# Patient Record
Sex: FEMALE | Race: WHITE | Employment: UNEMPLOYED | URBAN - METROPOLITAN AREA
[De-identification: names, ages, dates, MRNs, and addresses within clinical notes are randomized per-mention and may not be internally consistent; named-entity substitution may affect disease eponyms.]

---

## 2017-02-15 PROBLEM — F31.9 BIPOLAR DISORDER WITH DEPRESSION (HCC): Status: ACTIVE | Noted: 2017-02-15

## 2017-02-15 PROBLEM — I10 ESSENTIAL HYPERTENSION: Status: ACTIVE | Noted: 2017-02-15

## 2017-02-15 PROBLEM — E78.2 MIXED HYPERLIPIDEMIA: Status: ACTIVE | Noted: 2017-02-15

## 2017-05-16 PROBLEM — F41.9 ANXIETY: Status: ACTIVE | Noted: 2017-05-16

## 2017-05-16 PROBLEM — K21.9 GASTROESOPHAGEAL REFLUX DISEASE WITHOUT ESOPHAGITIS: Status: ACTIVE | Noted: 2017-05-16

## 2017-06-13 PROCEDURE — 75810000289 HC I&D ABSCESS SIMP/COMP/MULT: Performed by: EMERGENCY MEDICINE

## 2017-06-13 PROCEDURE — 99283 EMERGENCY DEPT VISIT LOW MDM: CPT | Performed by: EMERGENCY MEDICINE

## 2017-06-14 ENCOUNTER — HOSPITAL ENCOUNTER (EMERGENCY)
Age: 42
Discharge: HOME | End: 2017-06-14
Attending: EMERGENCY MEDICINE
Payer: COMMERCIAL

## 2017-06-14 VITALS
TEMPERATURE: 98.4 F | BODY MASS INDEX: 33.59 KG/M2 | WEIGHT: 214 LBS | DIASTOLIC BLOOD PRESSURE: 55 MMHG | OXYGEN SATURATION: 98 % | SYSTOLIC BLOOD PRESSURE: 110 MMHG | HEART RATE: 102 BPM | HEIGHT: 67 IN | RESPIRATION RATE: 16 BRPM

## 2017-06-14 DIAGNOSIS — L02.419 THIGH ABSCESS: Primary | ICD-10-CM

## 2017-06-14 PROCEDURE — 74011250637 HC RX REV CODE- 250/637: Performed by: EMERGENCY MEDICINE

## 2017-06-14 PROCEDURE — 75810000289 HC I&D ABSCESS SIMP/COMP/MULT: Performed by: EMERGENCY MEDICINE

## 2017-06-14 RX ORDER — HYDROCODONE BITARTRATE AND ACETAMINOPHEN 5; 325 MG/1; MG/1
1 TABLET ORAL ONCE
Status: COMPLETED | OUTPATIENT
Start: 2017-06-14 | End: 2017-06-14

## 2017-06-14 RX ORDER — CLINDAMYCIN HYDROCHLORIDE 150 MG/1
300 CAPSULE ORAL 3 TIMES DAILY
Qty: 42 CAP | Refills: 0 | Status: SHIPPED | OUTPATIENT
Start: 2017-06-14 | End: 2017-06-21

## 2017-06-14 RX ADMIN — HYDROCODONE BITARTRATE AND ACETAMINOPHEN 1 TABLET: 5; 325 TABLET ORAL at 03:05

## 2017-06-14 NOTE — DISCHARGE INSTRUCTIONS
Return with any fevers, spreading redness, increasing pain, worsening symptoms, or additional concerns. Follow-up with your primary care doctor in 2 or 3 days for reevaluation.

## 2017-06-14 NOTE — ED TRIAGE NOTES
Pt states she has an abscess type knot on her upper inner thigh that has a mole attached to it.  Pt c/o pain that is severe

## 2017-06-14 NOTE — ED PROVIDER NOTES
HPI Comments: 59-year-old lady with a history of an abscess in her inner, upper, left thigh that started over the last 24 hours. Patient says it is extremely tender to the touch. Patient says she has never had this kind of problem before. She denies any fevers or chills. Elements of this note were created using speech recognition software. As such, there may be errors of speech recognition present. Patient is a 39 y.o. female presenting with abscess. The history is provided by the patient. Abscess           Past Medical History:   Diagnosis Date    Bipolar disorder ([address]     Borderline personality disorder     Calculus of kidney     Chronic kidney disease     glomerular nephritis    GERD (gastroesophageal reflux disease)     Headache     Hypertension     Morbid obesity ([address]     OCD (obsessive compulsive disorder)     Panic attacks     Psychiatric disorder     bipolar, ptsd, anxiety, ocd,mmd       Past Surgical History:   Procedure Laterality Date    HX APPENDECTOMY      HX GYN          HX GYN      tubal ligation         Family History:   Problem Relation Age of Onset    Breast Cancer Mother 39    Cancer Mother      cervical     Breast Cancer Sister 44    Heart Disease Maternal Grandmother 24     aneurysm     Cancer Father      kidney     Heart Disease Father     Kidney Disease Father     No Known Problems Maternal Grandfather     Breast Cancer Paternal Grandmother 43    Heart Failure Paternal Grandfather     Other Sister      brain tumor        Social History     Social History    Marital status:      Spouse name: N/A    Number of children: N/A    Years of education: N/A     Occupational History    Not on file.      Social History Main Topics    Smoking status: Current Every Day Smoker     Packs/day: 1.00     Years: 20.00    Smokeless tobacco: Never Used    Alcohol use No      Comment: quit 4 years ago    Drug use: No    Sexual activity: Yes     Other Topics Concern    Not on file     Social History Narrative         ALLERGIES: Erythromycin; Novocain [procaine]; Pcn [penicillins]; Phenobarbital; Sulfa (sulfonamide antibiotics); Sulfur; and Tetracycline    Review of Systems   Constitutional: Negative for chills and fever. Gastrointestinal: Negative for nausea and vomiting. Skin: Positive for color change. Negative for wound. Vitals:    06/13/17 2257   BP: 119/65   Pulse: (!) 102   Resp: 16   Temp: 98.4 °F (36.9 °C)   SpO2: 95%   Weight: 97.1 kg (214 lb)   Height: 5' 7\" (1.702 m)            Physical Exam   Constitutional: She is oriented to person, place, and time. Neurological: She is alert and oriented to person, place, and time. Skin:   Quarter-sized abscess in her left upper inner thigh   Nursing note and vitals reviewed. [facility]  ED Course       I&D Abcess Simple  Date/Time: 6/14/2017 2:53 AM  Performed by: Blayne Hahn  Authorized by: Blayne Hahn     Consent:     Consent obtained:  Verbal    Consent given by:  Patient    Risks discussed:  Incomplete drainage    Alternatives discussed:  No treatment  Location:     Type:  Abscess    Location:  Lower extremity    Lower extremity location:  Leg    Leg location:  L upper leg  Pre-procedure details:     Skin preparation:  Chloraprep  Anesthesia (see MAR for exact dosages): Anesthesia method:  Local infiltration    Local anesthetic:  Lidocaine 1% w/o epi  Procedure type:     Complexity:  Simple  Procedure details:     Needle aspiration: no      Incision types:  Single straight    Incision depth:  Subcutaneous    Scalpel blade:  11    Wound management:  Probed and deloculated    Drainage:  Purulent    Drainage amount: Moderate    Wound treatment:  Wound left open    Packing materials:  None  Post-procedure details:     Patient tolerance of procedure:   Tolerated well, no immediate complications

## 2017-09-28 PROBLEM — N05.9 GLOMERULONEPHRITIS: Status: ACTIVE | Noted: 2017-09-28

## 2017-10-19 ENCOUNTER — HOSPITAL ENCOUNTER (OUTPATIENT)
Dept: ULTRASOUND IMAGING | Age: 42
Discharge: HOME | End: 2017-10-19
Attending: INTERNAL MEDICINE
Payer: COMMERCIAL

## 2017-10-19 DIAGNOSIS — N18.9 CHRONIC KIDNEY DISEASE (CKD): ICD-10-CM

## 2017-10-19 DIAGNOSIS — I10 HYPERTENSION: ICD-10-CM

## 2017-10-19 PROCEDURE — 76770 US EXAM ABDO BACK WALL COMP: CPT

## 2017-10-19 PROCEDURE — 93975 VASCULAR STUDY: CPT

## 2018-02-13 ENCOUNTER — APPOINTMENT (RX ONLY)
Dept: URBAN - METROPOLITAN AREA CLINIC 349 | Facility: CLINIC | Age: 43
Setting detail: DERMATOLOGY
End: 2018-02-13

## 2018-02-13 DIAGNOSIS — D18.0 HEMANGIOMA: ICD-10-CM

## 2018-02-13 DIAGNOSIS — L85.8 OTHER SPECIFIED EPIDERMAL THICKENING: ICD-10-CM

## 2018-02-13 DIAGNOSIS — Z80.8 FAMILY HISTORY OF MALIGNANT NEOPLASM OF OTHER ORGANS OR SYSTEMS: ICD-10-CM

## 2018-02-13 DIAGNOSIS — B07.8 OTHER VIRAL WARTS: ICD-10-CM

## 2018-02-13 DIAGNOSIS — L21.8 OTHER SEBORRHEIC DERMATITIS: ICD-10-CM

## 2018-02-13 DIAGNOSIS — Z71.89 OTHER SPECIFIED COUNSELING: ICD-10-CM

## 2018-02-13 PROBLEM — L29.8 OTHER PRURITUS: Status: ACTIVE | Noted: 2018-02-13

## 2018-02-13 PROBLEM — C44.519 BASAL CELL CARCINOMA OF SKIN OF OTHER PART OF TRUNK: Status: ACTIVE | Noted: 2018-02-13

## 2018-02-13 PROBLEM — L85.3 XEROSIS CUTIS: Status: ACTIVE | Noted: 2018-02-13

## 2018-02-13 PROBLEM — C44.719 BASAL CELL CARCINOMA OF SKIN OF LEFT LOWER LIMB, INCLUDING HIP: Status: ACTIVE | Noted: 2018-02-13

## 2018-02-13 PROBLEM — C44.319 BASAL CELL CARCINOMA OF SKIN OF OTHER PARTS OF FACE: Status: ACTIVE | Noted: 2018-02-13

## 2018-02-13 PROBLEM — D18.01 HEMANGIOMA OF SKIN AND SUBCUTANEOUS TISSUE: Status: ACTIVE | Noted: 2018-02-13

## 2018-02-13 PROBLEM — I10 ESSENTIAL (PRIMARY) HYPERTENSION: Status: ACTIVE | Noted: 2018-02-13

## 2018-02-13 PROCEDURE — 11306 SHAVE SKIN LESION 0.6-1.0 CM: CPT | Mod: 76

## 2018-02-13 PROCEDURE — ? RECOMMENDATIONS

## 2018-02-13 PROCEDURE — 11306 SHAVE SKIN LESION 0.6-1.0 CM: CPT

## 2018-02-13 PROCEDURE — ? SHAVE REMOVAL

## 2018-02-13 PROCEDURE — 99242 OFF/OP CONSLTJ NEW/EST SF 20: CPT | Mod: 25

## 2018-02-13 PROCEDURE — 88305 TISSUE EXAM BY PATHOLOGIST: CPT

## 2018-02-13 PROCEDURE — ? COUNSELING

## 2018-02-13 PROCEDURE — ? TREATMENT REGIMEN

## 2018-02-13 PROCEDURE — ? DEFER

## 2018-02-13 PROCEDURE — A4550 SURGICAL TRAYS: HCPCS

## 2018-02-13 PROCEDURE — ? PATHOLOGY BILLING

## 2018-02-13 ASSESSMENT — LOCATION SIMPLE DESCRIPTION DERM
LOCATION SIMPLE: LEFT UPPER BACK
LOCATION SIMPLE: RIGHT HEEL
LOCATION SIMPLE: CHEST
LOCATION SIMPLE: LEFT THUMB
LOCATION SIMPLE: SCALP
LOCATION SIMPLE: LEFT HEEL
LOCATION SIMPLE: RIGHT FOOT
LOCATION SIMPLE: ABDOMEN

## 2018-02-13 ASSESSMENT — LOCATION ZONE DERM
LOCATION ZONE: FINGER
LOCATION ZONE: FEET
LOCATION ZONE: TRUNK
LOCATION ZONE: SCALP

## 2018-02-13 ASSESSMENT — LOCATION DETAILED DESCRIPTION DERM
LOCATION DETAILED: LEFT DISTAL RADIAL THUMB
LOCATION DETAILED: RIGHT HEEL
LOCATION DETAILED: RIGHT CENTRAL PARIETAL SCALP
LOCATION DETAILED: LEFT HEEL
LOCATION DETAILED: LEFT SUPERIOR UPPER BACK
LOCATION DETAILED: LEFT SUPERIOR PARIETAL SCALP
LOCATION DETAILED: RIGHT LATERAL ACHILLES SKIN
LOCATION DETAILED: MIDDLE STERNUM
LOCATION DETAILED: LEFT CENTRAL PARIETAL SCALP
LOCATION DETAILED: SUBXIPHOID

## 2018-02-13 NOTE — HPI: SKIN LESIONS
How Severe Is Your Skin Lesion?: mild
Have Your Skin Lesions Been Treated?: not been treated
Is This A New Presentation, Or A Follow-Up?: Skin Lesions
Which Family Member (Optional)?: Father sister

## 2018-02-13 NOTE — PROCEDURE: RECOMMENDATIONS
Detail Level: Zone
Recommendation Preamble: Treatment recommendations
Recommendations (Free Text): Patient advised otc Head and shoulders with zinc wash affected area daily

## 2018-02-13 NOTE — PROCEDURE: SHAVE REMOVAL
Medical Necessity Information: It is in your best interest to select a reason for this procedure from the list below. All of these items fulfill various CMS LCD requirements except the new and changing color options.
Wound Care: Vaseline
Size Of Lesion In Cm (Required): 0.6
Medical Necessity Clause: This procedure was medically necessary because the lesion that was treated was: changing
Notification Instructions: Patient will be notified of biopsy results. However, patient instructed to call the office if not contacted within 2 weeks.
Bill For Surgical Tray: yes
Anesthesia Volume In Cc: 0.5
Accession #: Dr Marx read
Biopsy Method: Dermablade
Consent was obtained from the patient. The risks and benefits to therapy were discussed in detail. Specifically, the risks of infection, scarring, bleeding, prolonged wound healing, incomplete removal, allergy to anesthesia, nerve injury and recurrence were addressed. Prior to the procedure, the treatment site was clearly identified and confirmed by the patient. All components of Universal Protocol/PAUSE Rule completed.
Accession #: Dr Marx read
X Size Of Lesion In Cm (Optional): 0.7
Anesthesia Type: 2% lidocaine with epinephrine
Bill 08370 For Specimen Handling/Conveyance To Laboratory?: no
Hemostasis: Electrocautery
Detail Level: Detailed
Billing Type: Third-Party Bill
Post-Care Instructions: I reviewed with the patient in detail post-care instructions. Patient is to keep the biopsy site dry overnight, and then apply vaseline twice daily until healed. Patient may apply hydrogen peroxide soaks to remove any crusting. After the procedure, the patient was observed for 5-10 minutes and was oriented to person, place and time and demied feeling dizzy, queasy, and stated that they did not feel that they were going to faint.
Size Of Margin In Cm (Margins Are Not Added To Billing Dimensions): -
X Size Of Lesion In Cm (Optional): 0

## 2018-02-13 NOTE — HPI: WARTS (VERRUCA)
Is This A New Presentation, Or A Follow-Up?: Warts
How Severe Are Your Warts?: mild
Additional History: patient saw podiatrist for treatment

## 2018-04-17 ENCOUNTER — HOSPITAL ENCOUNTER (OUTPATIENT)
Dept: CT IMAGING | Age: 43
Discharge: HOME | End: 2018-04-17
Attending: NURSE PRACTITIONER
Payer: COMMERCIAL

## 2018-04-17 VITALS — BODY MASS INDEX: 29.82 KG/M2 | HEIGHT: 67 IN | WEIGHT: 190 LBS

## 2018-04-17 DIAGNOSIS — R31.0 GROSS HEMATURIA: ICD-10-CM

## 2018-04-17 DIAGNOSIS — R63.4 WEIGHT LOSS: ICD-10-CM

## 2018-04-17 DIAGNOSIS — R53.83 FATIGUE, UNSPECIFIED TYPE: ICD-10-CM

## 2018-04-17 LAB — CREAT BLD-MCNC: 1.5 MG/DL (ref 0.8–1.5)

## 2018-04-17 PROCEDURE — 74011000258 HC RX REV CODE- 258

## 2018-04-17 PROCEDURE — 82565 ASSAY OF CREATININE: CPT

## 2018-04-17 PROCEDURE — 74011250636 HC RX REV CODE- 250/636

## 2018-04-17 PROCEDURE — 74011636320 HC RX REV CODE- 636/320

## 2018-04-17 PROCEDURE — 74178 CT ABD&PLV WO CNTR FLWD CNTR: CPT

## 2018-04-17 RX ORDER — SODIUM CHLORIDE 0.9 % (FLUSH) 0.9 %
10 SYRINGE (ML) INJECTION
Status: COMPLETED | OUTPATIENT
Start: 2018-04-17 | End: 2018-04-17

## 2018-04-17 RX ORDER — SODIUM CHLORIDE 9 MG/ML
3 INJECTION, SOLUTION INTRAVENOUS ONCE
Status: COMPLETED | OUTPATIENT
Start: 2018-04-17 | End: 2018-04-17

## 2018-04-17 RX ORDER — SODIUM CHLORIDE 9 MG/ML
1 INJECTION, SOLUTION INTRAVENOUS AS NEEDED
Status: ACTIVE | OUTPATIENT
Start: 2018-04-17 | End: 2018-04-18

## 2018-04-17 RX ADMIN — SODIUM CHLORIDE 100 ML: 900 INJECTION, SOLUTION INTRAVENOUS at 12:33

## 2018-04-17 RX ADMIN — SODIUM CHLORIDE 3 ML/KG/HR: 900 INJECTION, SOLUTION INTRAVENOUS at 09:00

## 2018-04-17 RX ADMIN — Medication 10 ML: at 12:33

## 2018-04-17 RX ADMIN — IOPAMIDOL 100 ML: 755 INJECTION, SOLUTION INTRAVENOUS at 12:33

## 2018-04-17 NOTE — PROGRESS NOTES
No clear indication for hematuria and other symptoms. Referral placed to Urology on 4/5/18, please make sure to follow up with them.

## 2018-04-17 NOTE — PROGRESS NOTES
Patient tolerated IV fluids well. Ate lunch, appetite good. Ambulated several times to bathroom to void. Instructed to force oral fluids today and notify physician with any problems. Patient verbalizes understanding. PIV removed intact, site clear. Patient discharged ambulatory in stable condition.

## 2018-04-20 ENCOUNTER — APPOINTMENT (RX ONLY)
Dept: URBAN - METROPOLITAN AREA CLINIC 349 | Facility: CLINIC | Age: 43
Setting detail: DERMATOLOGY
End: 2018-04-20

## 2018-04-20 DIAGNOSIS — B07.8 OTHER VIRAL WARTS: ICD-10-CM

## 2018-04-20 DIAGNOSIS — L57.0 ACTINIC KERATOSIS: ICD-10-CM

## 2018-04-20 PROCEDURE — ? LIQUID NITROGEN

## 2018-04-20 PROCEDURE — ? COUNSELING

## 2018-04-20 PROCEDURE — 17110 DESTRUCTION B9 LES UP TO 14: CPT

## 2018-04-20 PROCEDURE — 17000 DESTRUCT PREMALG LESION: CPT | Mod: 59

## 2018-04-20 PROCEDURE — ? BENIGN DESTRUCTION

## 2018-04-20 PROCEDURE — 17003 DESTRUCT PREMALG LES 2-14: CPT

## 2018-04-20 ASSESSMENT — PAIN INTENSITY VAS: HOW INTENSE IS YOUR PAIN 0 BEING NO PAIN, 10 BEING THE MOST SEVERE PAIN POSSIBLE?: 1/10 PAIN

## 2018-04-20 ASSESSMENT — LOCATION SIMPLE DESCRIPTION DERM
LOCATION SIMPLE: LEFT THIGH
LOCATION SIMPLE: RIGHT GREAT TOE
LOCATION SIMPLE: RIGHT FOOT
LOCATION SIMPLE: ABDOMEN
LOCATION SIMPLE: ABDOMEN
LOCATION SIMPLE: LEFT THUMB
LOCATION SIMPLE: LEFT THIGH
LOCATION SIMPLE: LEFT THUMB

## 2018-04-20 ASSESSMENT — LOCATION DETAILED DESCRIPTION DERM
LOCATION DETAILED: LEFT DISTAL RADIAL THUMB
LOCATION DETAILED: LEFT RIB CAGE
LOCATION DETAILED: LEFT RIB CAGE
LOCATION DETAILED: RIGHT DORSAL GREAT TOE
LOCATION DETAILED: LEFT ANTERIOR PROXIMAL THIGH
LOCATION DETAILED: LEFT DISTAL RADIAL THUMB
LOCATION DETAILED: RIGHT LATERAL DORSAL FOOT
LOCATION DETAILED: LEFT ANTERIOR PROXIMAL THIGH

## 2018-04-20 ASSESSMENT — LOCATION ZONE DERM
LOCATION ZONE: TRUNK
LOCATION ZONE: FEET
LOCATION ZONE: LEG
LOCATION ZONE: LEG
LOCATION ZONE: TOE
LOCATION ZONE: FINGER
LOCATION ZONE: TRUNK
LOCATION ZONE: FINGER

## 2018-04-20 NOTE — PROCEDURE: BENIGN DESTRUCTION
Post-Care Instructions: I reviewed with the patient in detail post-care instructions. Patient is to wear sunprotection, and avoid picking at any of the treated lesions. Pt may apply Vaseline to crusted or scabbing areas.
Medical Necessity Clause: This procedure was medically necessary because the lesions that were treated were:
Add 52 Modifier (Optional): no
Medical Necessity Information: It is in your best interest to select a reason for this procedure from the list below. All of these items fulfill various CMS LCD requirements except the new and changing color options.
Include Z78.9 (Other Specified Conditions Influencing Health Status) As An Associated Diagnosis?: Yes
Consent: The patient's consent was obtained including but not limited to risks of crusting, scabbing, blistering, scarring, darker or lighter pigmentary change, recurrence, incomplete removal and infection.
Treatment Number (Will Not Render If 0): 0
Detail Level: Zone

## 2018-04-20 NOTE — PROCEDURE: LIQUID NITROGEN
Post-Care Instructions: I reviewed with the patient in detail post-care instructions. Patient is to wear sunprotection, and avoid picking at any of the treated lesions. Pt may apply Vaseline to crusted or scabbing areas.
Consent: The patient's consent was obtained including but not limited to risks of crusting, scabbing, blistering, scarring, darker or lighter pigmentary change, recurrence, incomplete removal and infection.
Detail Level: Zone
Duration Of Freeze Thaw-Cycle (Seconds): 3
Number Of Freeze-Thaw Cycles: 2 freeze-thaw cycles
Render Post-Care Instructions In Note?: no

## 2018-05-17 ENCOUNTER — HOSPITAL ENCOUNTER (EMERGENCY)
Age: 43
Discharge: HOME | End: 2018-05-17
Payer: COMMERCIAL

## 2018-05-17 ENCOUNTER — APPOINTMENT (OUTPATIENT)
Dept: CT IMAGING | Age: 43
End: 2018-05-17
Payer: COMMERCIAL

## 2018-05-17 VITALS
SYSTOLIC BLOOD PRESSURE: 121 MMHG | BODY MASS INDEX: 26.98 KG/M2 | HEART RATE: 96 BPM | HEIGHT: 68 IN | DIASTOLIC BLOOD PRESSURE: 76 MMHG | OXYGEN SATURATION: 99 % | WEIGHT: 178 LBS | TEMPERATURE: 98.5 F | RESPIRATION RATE: 20 BRPM

## 2018-05-17 DIAGNOSIS — L03.811 CELLULITIS OF HEAD EXCEPT FACE: Primary | ICD-10-CM

## 2018-05-17 LAB
ANION GAP SERPL CALC-SCNC: 6 MMOL/L (ref 7–16)
BASOPHILS # BLD: 0 K/UL (ref 0–0.2)
BASOPHILS NFR BLD: 0 % (ref 0–2)
BUN SERPL-MCNC: 18 MG/DL (ref 6–23)
CALCIUM SERPL-MCNC: 8.9 MG/DL (ref 8.3–10.4)
CHLORIDE SERPL-SCNC: 103 MMOL/L (ref 98–107)
CO2 SERPL-SCNC: 31 MMOL/L (ref 21–32)
CREAT SERPL-MCNC: 1.58 MG/DL (ref 0.6–1)
DIFFERENTIAL METHOD BLD: ABNORMAL
EOSINOPHIL # BLD: 0.1 K/UL (ref 0–0.8)
EOSINOPHIL NFR BLD: 1 % (ref 0.5–7.8)
ERYTHROCYTE [DISTWIDTH] IN BLOOD BY AUTOMATED COUNT: 13.9 % (ref 11.9–14.6)
GLUCOSE SERPL-MCNC: 87 MG/DL (ref 65–100)
HCT VFR BLD AUTO: 39 % (ref 35.8–46.3)
HGB BLD-MCNC: 13.1 G/DL (ref 11.7–15.4)
IMM GRANULOCYTES # BLD: 0 K/UL (ref 0–0.5)
IMM GRANULOCYTES NFR BLD AUTO: 0 % (ref 0–5)
LYMPHOCYTES # BLD: 2.2 K/UL (ref 0.5–4.6)
LYMPHOCYTES NFR BLD: 23 % (ref 13–44)
MCH RBC QN AUTO: 30 PG (ref 26.1–32.9)
MCHC RBC AUTO-ENTMCNC: 33.6 G/DL (ref 31.4–35)
MCV RBC AUTO: 89.2 FL (ref 79.6–97.8)
MONOCYTES # BLD: 0.7 K/UL (ref 0.1–1.3)
MONOCYTES NFR BLD: 7 % (ref 4–12)
NEUTS SEG # BLD: 6.6 K/UL (ref 1.7–8.2)
NEUTS SEG NFR BLD: 69 % (ref 43–78)
PLATELET # BLD AUTO: 228 K/UL (ref 150–450)
PMV BLD AUTO: 10 FL (ref 10.8–14.1)
POTASSIUM SERPL-SCNC: 3.4 MMOL/L (ref 3.5–5.1)
RBC # BLD AUTO: 4.37 M/UL (ref 4.05–5.25)
SODIUM SERPL-SCNC: 140 MMOL/L (ref 136–145)
WBC # BLD AUTO: 9.7 K/UL (ref 4.3–11.1)

## 2018-05-17 PROCEDURE — 74011000258 HC RX REV CODE- 258

## 2018-05-17 PROCEDURE — 74011250636 HC RX REV CODE- 250/636

## 2018-05-17 PROCEDURE — 74011636320 HC RX REV CODE- 636/320

## 2018-05-17 PROCEDURE — 96372 THER/PROPH/DIAG INJ SC/IM: CPT

## 2018-05-17 PROCEDURE — 96374 THER/PROPH/DIAG INJ IV PUSH: CPT

## 2018-05-17 PROCEDURE — 85025 COMPLETE CBC W/AUTO DIFF WBC: CPT

## 2018-05-17 PROCEDURE — 99283 EMERGENCY DEPT VISIT LOW MDM: CPT

## 2018-05-17 PROCEDURE — 70491 CT SOFT TISSUE NECK W/DYE: CPT

## 2018-05-17 PROCEDURE — 80048 BASIC METABOLIC PNL TOTAL CA: CPT

## 2018-05-17 PROCEDURE — 96361 HYDRATE IV INFUSION ADD-ON: CPT

## 2018-05-17 RX ORDER — SODIUM CHLORIDE 9 MG/ML
1000 INJECTION, SOLUTION INTRAVENOUS ONCE
Status: COMPLETED | OUTPATIENT
Start: 2018-05-17 | End: 2018-05-17

## 2018-05-17 RX ORDER — NAPROXEN 500 MG/1
500 TABLET ORAL 2 TIMES DAILY WITH MEALS
Qty: 20 TAB | Refills: 0 | Status: SHIPPED | OUTPATIENT
Start: 2018-05-17 | End: 2018-05-18

## 2018-05-17 RX ORDER — NALBUPHINE HYDROCHLORIDE 10 MG/ML
10 INJECTION, SOLUTION INTRAMUSCULAR; INTRAVENOUS; SUBCUTANEOUS
Status: COMPLETED | OUTPATIENT
Start: 2018-05-17 | End: 2018-05-17

## 2018-05-17 RX ORDER — SODIUM CHLORIDE 0.9 % (FLUSH) 0.9 %
10 SYRINGE (ML) INJECTION
Status: COMPLETED | OUTPATIENT
Start: 2018-05-17 | End: 2018-05-17

## 2018-05-17 RX ORDER — CLINDAMYCIN HYDROCHLORIDE 300 MG/1
300 CAPSULE ORAL 4 TIMES DAILY
Qty: 28 CAP | Refills: 0 | Status: SHIPPED | OUTPATIENT
Start: 2018-05-17 | End: 2018-05-17

## 2018-05-17 RX ORDER — CLINDAMYCIN HYDROCHLORIDE 300 MG/1
300 CAPSULE ORAL 4 TIMES DAILY
Qty: 28 CAP | Refills: 0 | Status: SHIPPED | OUTPATIENT
Start: 2018-05-17 | End: 2018-05-24

## 2018-05-17 RX ORDER — KETOROLAC TROMETHAMINE 30 MG/ML
60 INJECTION, SOLUTION INTRAMUSCULAR; INTRAVENOUS
Status: COMPLETED | OUTPATIENT
Start: 2018-05-17 | End: 2018-05-17

## 2018-05-17 RX ORDER — NAPROXEN 500 MG/1
500 TABLET ORAL 2 TIMES DAILY WITH MEALS
Qty: 20 TAB | Refills: 0 | Status: SHIPPED | OUTPATIENT
Start: 2018-05-17 | End: 2018-05-17

## 2018-05-17 RX ADMIN — Medication 10 ML: at 11:44

## 2018-05-17 RX ADMIN — NALBUPHINE HYDROCHLORIDE 10 MG: 10 INJECTION, SOLUTION INTRAMUSCULAR; INTRAVENOUS; SUBCUTANEOUS at 12:25

## 2018-05-17 RX ADMIN — IOPAMIDOL: 755 INJECTION, SOLUTION INTRAVENOUS at 11:43

## 2018-05-17 RX ADMIN — SODIUM CHLORIDE 1000 ML: 900 INJECTION, SOLUTION INTRAVENOUS at 12:37

## 2018-05-17 RX ADMIN — SODIUM CHLORIDE 100 ML: 900 INJECTION, SOLUTION INTRAVENOUS at 11:44

## 2018-05-17 RX ADMIN — SODIUM CHLORIDE 1000 ML: 900 INJECTION, SOLUTION INTRAVENOUS at 10:47

## 2018-05-17 RX ADMIN — KETOROLAC TROMETHAMINE 60 MG: 30 INJECTION, SOLUTION INTRAMUSCULAR at 08:58

## 2018-05-17 NOTE — DISCHARGE INSTRUCTIONS
Cellulitis: Care Instructions  Your Care Instructions    Cellulitis is a skin infection. It often occurs after a break in the skin from a scrape, cut, bite, or puncture, or after a rash. The doctor has checked you carefully, but problems can develop later. If you notice any problems or new symptoms, get medical treatment right away. Follow-up care is a key part of your treatment and safety. Be sure to make and go to all appointments, and call your doctor if you are having problems. It's also a good idea to know your test results and keep a list of the medicines you take. How can you care for yourself at home? · Take your antibiotics as directed. Do not stop taking them just because you feel better. You need to take the full course of antibiotics. · Prop up the infected area on pillows to reduce pain and swelling. Try to keep the area above the level of your heart as often as you can. · If your doctor told you how to care for your wound, follow your doctor's instructions. If you did not get instructions, follow this general advice:  ¨ Wash the wound with clean water 2 times a day. Don't use hydrogen peroxide or alcohol, which can slow healing. ¨ You may cover the wound with a thin layer of petroleum jelly, such as Vaseline, and a nonstick bandage. ¨ Apply more petroleum jelly and replace the bandage as needed. · Be safe with medicines. Take pain medicines exactly as directed. ¨ If the doctor gave you a prescription medicine for pain, take it as prescribed. ¨ If you are not taking a prescription pain medicine, ask your doctor if you can take an over-the-counter medicine. To prevent cellulitis in the future  · Try to prevent cuts, scrapes, or other injuries to your skin. Cellulitis most often occurs where there is a break in the skin. · If you get a scrape, cut, mild burn, or bite, wash the wound with clean water as soon as you can to help avoid infection.  Don't use hydrogen peroxide or alcohol, which can slow healing. · If you have swelling in your legs (edema), support stockings and good skin care may help prevent leg sores and cellulitis. · Take care of your feet, especially if you have diabetes or other conditions that increase the risk of infection. Wear shoes and socks. Do not go barefoot. If you have athlete's foot or other skin problems on your feet, talk to your doctor about how to treat them. When should you call for help? Call your doctor now or seek immediate medical care if:  ? · You have signs that your infection is getting worse, such as:  ¨ Increased pain, swelling, warmth, or redness. ¨ Red streaks leading from the area. ¨ Pus draining from the area. ¨ A fever. ? · You get a rash. ? Watch closely for changes in your health, and be sure to contact your doctor if:  ? · You are not getting better after 1 day (24 hours). ? · You do not get better as expected. Where can you learn more? Go to http://cori-zelda.info/. Thomas Suarez in the search box to learn more about \"Cellulitis: Care Instructions. \"  Current as of: October 13, 2016  Content Version: 11.4  © 4632-8034 [employer]. Care instructions adapted under license by [employer] (which disclaims liability or warranty for this information). If you have questions about a medical condition or this instruction, always ask your healthcare professional. [place] [address] any warranty or liability for your use of this information.

## 2018-05-17 NOTE — ED PROVIDER NOTES
HPI Comments: 58-year-old female complaining of the indurated tender area behind her right ear. This is not acute  And on for several days or weeks. No fevers chills no drainage. No redness. Patient's been to the ER before for abscesses although she's never been told it was MRSA. No fevers or chills    Patient is a 43 y.o. female presenting with abscess. The history is provided by the patient. Abscess    This is a new problem. The current episode started more than 1 week ago. The problem has been gradually worsening. The problem is associated with nothing. There has been no fever. The rash is present on the scalp. Past Medical History:   Diagnosis Date    Bipolar disorder ([address]     Borderline personality disorder     Calculus of kidney     Chronic kidney disease     glomerular nephritis    GERD (gastroesophageal reflux disease)     Headache     Hypertension     Morbid obesity ([address]     OCD (obsessive compulsive disorder)     Panic attacks     Psychiatric disorder     bipolar, ptsd, anxiety, ocd,mmd       Past Surgical History:   Procedure Laterality Date    HX APPENDECTOMY      HX GYN          HX GYN      tubal ligation         Family History:   Problem Relation Age of Onset    Breast Cancer Mother 39    Cancer Mother      cervical     Breast Cancer Sister 44    Heart Disease Maternal Grandmother 24     aneurysm     Cancer Father      kidney     Heart Disease Father     Kidney Disease Father     No Known Problems Maternal Grandfather     Breast Cancer Paternal Grandmother 43    Heart Failure Paternal Grandfather     Other Sister      brain tumor        Social History     Social History    Marital status:      Spouse name: N/A    Number of children: N/A    Years of education: N/A     Occupational History    Not on file.      Social History Main Topics    Smoking status: Current Every Day Smoker     Packs/day: 1.00     Years: 20.00    Smokeless tobacco: Never Used    Alcohol use No      Comment: quit 4 years ago    Drug use: No    Sexual activity: Yes     Other Topics Concern    Not on file     Social History Narrative         ALLERGIES: Erythromycin; Novocain [procaine]; Pcn [penicillins]; Phenobarbital; Sulfa (sulfonamide antibiotics); Sulfur; and Tetracycline    Review of Systems   Constitutional: Negative. Negative for activity change. HENT: Negative. Eyes: Negative. Respiratory: Negative. Cardiovascular: Negative. Gastrointestinal: Negative. Genitourinary: Negative. Musculoskeletal: Negative. Skin: Negative. Neurological: Negative. Psychiatric/Behavioral: Negative. All other systems reviewed and are negative. Vitals:    05/17/18 0801   BP: 125/71   Pulse: 100   Resp: 20   Temp: 98.7 °F (37.1 °C)   SpO2: 100%   Weight: 80.7 kg (178 lb)   Height: 5' 8\" (1.727 m)            Physical Exam   Constitutional: She is oriented to person, place, and time. She appears well-developed and well-nourished. No distress. HENT:   Head: Normocephalic and atraumatic. Right Ear: External ear normal.   Left Ear: External ear normal.   Nose: Nose normal.   Mouth/Throat: Oropharynx is clear and moist. No oropharyngeal exudate. Eyes: Conjunctivae and EOM are normal. Pupils are equal, round, and reactive to light. Right eye exhibits no discharge. Left eye exhibits no discharge. No scleral icterus. Neck: Normal range of motion. Neck supple. No JVD present. No tracheal deviation present. Cardiovascular: Normal rate, regular rhythm and intact distal pulses. Pulmonary/Chest: Effort normal and breath sounds normal. No stridor. No respiratory distress. She has no wheezes. She exhibits no tenderness. Abdominal: Soft. Bowel sounds are normal. She exhibits no distension and no mass. There is no tenderness. Musculoskeletal: Normal range of motion. She exhibits no edema or tenderness.    Neurological: She is alert and oriented to person, place, and time. No cranial nerve deficit. Skin: Skin is warm and dry. No rash noted. She is not diaphoretic. No erythema. No pallor. Psychiatric: She has a normal mood and affect. Her behavior is normal. Thought content normal.   Nursing note and vitals reviewed. MDM  Number of Diagnoses or Management Options  Diagnosis management comments: Feels like a hard sebaceous gland.   Would best be excised by a surgeon or dermatologist       Amount and/or Complexity of Data Reviewed  Tests in the radiology section of CPT®: ordered and reviewed          ED Course       Procedures

## 2018-05-17 NOTE — ED NOTES
I have reviewed discharge instructions with the patient. The patient verbalized understanding. Patient left ED via Discharge Method: ambulatory to Home with friend. Opportunity for questions and clarification provided. Patient given 2 scripts. To continue your aftercare when you leave the hospital, you may receive an automated call from our care team to check in on how you are doing. This is a free service and part of our promise to provide the best care and service to meet your aftercare needs.  If you have questions, or wish to unsubscribe from this service please call [phone]. Thank you for Choosing our [facility] [place] Emergency Department.

## 2018-05-17 NOTE — ED TRIAGE NOTES
Pt states abcess on back of neck for a few days but causing a headache and some nausea since last night.

## 2018-05-18 ENCOUNTER — HOSPITAL ENCOUNTER (EMERGENCY)
Age: 43
Discharge: HOME | End: 2018-05-18
Attending: EMERGENCY MEDICINE
Payer: COMMERCIAL

## 2018-05-18 VITALS
TEMPERATURE: 97.9 F | BODY MASS INDEX: 26.52 KG/M2 | HEART RATE: 72 BPM | DIASTOLIC BLOOD PRESSURE: 70 MMHG | HEIGHT: 68 IN | SYSTOLIC BLOOD PRESSURE: 115 MMHG | OXYGEN SATURATION: 100 % | RESPIRATION RATE: 18 BRPM | WEIGHT: 175 LBS

## 2018-05-18 DIAGNOSIS — L03.811 CELLULITIS OF SCALP: Primary | ICD-10-CM

## 2018-05-18 LAB
ALBUMIN SERPL-MCNC: 3.4 G/DL (ref 3.5–5)
ALBUMIN/GLOB SERPL: 1.1 {RATIO} (ref 1.2–3.5)
ALP SERPL-CCNC: 60 U/L (ref 50–136)
ALT SERPL-CCNC: 19 U/L (ref 12–65)
ANION GAP SERPL CALC-SCNC: 8 MMOL/L (ref 7–16)
AST SERPL-CCNC: 15 U/L (ref 15–37)
BASOPHILS # BLD: 0 K/UL (ref 0–0.2)
BASOPHILS NFR BLD: 0 % (ref 0–2)
BILIRUB SERPL-MCNC: 0.3 MG/DL (ref 0.2–1.1)
BUN SERPL-MCNC: 17 MG/DL (ref 6–23)
CALCIUM SERPL-MCNC: 8.6 MG/DL (ref 8.3–10.4)
CHLORIDE SERPL-SCNC: 105 MMOL/L (ref 98–107)
CO2 SERPL-SCNC: 28 MMOL/L (ref 21–32)
CREAT SERPL-MCNC: 1.58 MG/DL (ref 0.6–1)
DIFFERENTIAL METHOD BLD: ABNORMAL
EOSINOPHIL # BLD: 0.2 K/UL (ref 0–0.8)
EOSINOPHIL NFR BLD: 2 % (ref 0.5–7.8)
ERYTHROCYTE [DISTWIDTH] IN BLOOD BY AUTOMATED COUNT: 14 % (ref 11.9–14.6)
GLOBULIN SER CALC-MCNC: 3.1 G/DL (ref 2.3–3.5)
GLUCOSE SERPL-MCNC: 101 MG/DL (ref 65–100)
HCT VFR BLD AUTO: 38.9 % (ref 35.8–46.3)
HGB BLD-MCNC: 12.6 G/DL (ref 11.7–15.4)
IMM GRANULOCYTES # BLD: 0 K/UL (ref 0–0.5)
IMM GRANULOCYTES NFR BLD AUTO: 0 % (ref 0–5)
LYMPHOCYTES # BLD: 2.8 K/UL (ref 0.5–4.6)
LYMPHOCYTES NFR BLD: 26 % (ref 13–44)
MCH RBC QN AUTO: 29.2 PG (ref 26.1–32.9)
MCHC RBC AUTO-ENTMCNC: 32.4 G/DL (ref 31.4–35)
MCV RBC AUTO: 90.3 FL (ref 79.6–97.8)
MONOCYTES # BLD: 0.7 K/UL (ref 0.1–1.3)
MONOCYTES NFR BLD: 7 % (ref 4–12)
NEUTS SEG # BLD: 7 K/UL (ref 1.7–8.2)
NEUTS SEG NFR BLD: 65 % (ref 43–78)
PLATELET # BLD AUTO: 239 K/UL (ref 150–450)
PMV BLD AUTO: 9.9 FL (ref 10.8–14.1)
POTASSIUM SERPL-SCNC: 3.5 MMOL/L (ref 3.5–5.1)
PROT SERPL-MCNC: 6.5 G/DL (ref 6.3–8.2)
RBC # BLD AUTO: 4.31 M/UL (ref 4.05–5.25)
SODIUM SERPL-SCNC: 141 MMOL/L (ref 136–145)
WBC # BLD AUTO: 10.8 K/UL (ref 4.3–11.1)

## 2018-05-18 PROCEDURE — 74011250636 HC RX REV CODE- 250/636: Performed by: EMERGENCY MEDICINE

## 2018-05-18 PROCEDURE — 74011250637 HC RX REV CODE- 250/637: Performed by: EMERGENCY MEDICINE

## 2018-05-18 PROCEDURE — 96374 THER/PROPH/DIAG INJ IV PUSH: CPT | Performed by: EMERGENCY MEDICINE

## 2018-05-18 PROCEDURE — 96361 HYDRATE IV INFUSION ADD-ON: CPT | Performed by: EMERGENCY MEDICINE

## 2018-05-18 PROCEDURE — 80053 COMPREHEN METABOLIC PANEL: CPT | Performed by: EMERGENCY MEDICINE

## 2018-05-18 PROCEDURE — 85025 COMPLETE CBC W/AUTO DIFF WBC: CPT | Performed by: EMERGENCY MEDICINE

## 2018-05-18 PROCEDURE — 99283 EMERGENCY DEPT VISIT LOW MDM: CPT | Performed by: EMERGENCY MEDICINE

## 2018-05-18 RX ORDER — HYDROCODONE BITARTRATE AND ACETAMINOPHEN 5; 325 MG/1; MG/1
1 TABLET ORAL
Qty: 12 TAB | Refills: 0 | Status: SHIPPED | OUTPATIENT
Start: 2018-05-18 | End: 2018-05-18

## 2018-05-18 RX ORDER — HYDROCODONE BITARTRATE AND ACETAMINOPHEN 5; 325 MG/1; MG/1
1 TABLET ORAL ONCE
Status: COMPLETED | OUTPATIENT
Start: 2018-05-18 | End: 2018-05-18

## 2018-05-18 RX ORDER — ONDANSETRON 2 MG/ML
4 INJECTION INTRAMUSCULAR; INTRAVENOUS
Status: COMPLETED | OUTPATIENT
Start: 2018-05-18 | End: 2018-05-18

## 2018-05-18 RX ADMIN — HYDROCODONE BITARTRATE AND ACETAMINOPHEN 1 TABLET: 5; 325 TABLET ORAL at 04:34

## 2018-05-18 RX ADMIN — SODIUM CHLORIDE 1000 ML: 900 INJECTION, SOLUTION INTRAVENOUS at 04:07

## 2018-05-18 RX ADMIN — ONDANSETRON 4 MG: 2 INJECTION INTRAMUSCULAR; INTRAVENOUS at 04:07

## 2018-05-18 NOTE — ED PROVIDER NOTES
HPI:  43 F, here with pain in the right posterior scalp. Was seen her yesterday. Diagnosed with skin infection and likely a cyst.  Place on antibiotic. Continue to have pain. Throbbing and aching. Worsened today. No fever. ROS  Constitutional: No fever, no chills  Skin: no rash  Eye: No vision changes  ENMT:   Respiratory: No shortness of breath, no cough  Cardiovascular: No chest pain  Gastrointestinal: No vomiting, no nausea, no diarrhea,  no abdominal pain  : No dysuria  MSK: No back pain, no muscle pain  Neuro: + headache, no change in mental status, no numbness, no tingling, no weakness    All other review of systems positive per history of present illness and the above otherwise negative or noncontributory. Visit Vitals    /61 (BP 1 Location: Right arm, BP Patient Position: At rest;Sitting)    Pulse 73    Temp 97.9 °F (36.6 °C)    Resp 18    Ht 5' 8\" (1.727 m)    Wt 79.4 kg (175 lb)    SpO2 98%    BMI 26.61 kg/m2     Past Medical History:   Diagnosis Date    Bipolar disorder (HCC)     Borderline personality disorder     Calculus of kidney     Chronic kidney disease     glomerular nephritis    GERD (gastroesophageal reflux disease)     Headache     Hypertension     Morbid obesity (HCC)     OCD (obsessive compulsive disorder)     Panic attacks     Psychiatric disorder     bipolar, ptsd, anxiety, ocd,mmd     Past Surgical History:   Procedure Laterality Date    HX APPENDECTOMY      HX GYN          HX GYN      tubal ligation     Prior to Admission Medications   Prescriptions Last Dose Informant Patient Reported? Taking? ARIPiprazole (ABILIFY) 2 mg tablet   Yes No   Sig: Take 1 mg by mouth daily. LORazepam (ATIVAN) 0.5 mg tablet   No No   Sig: TAKE one tablet BY MOUTH THREE TIMES DAILY AS NEEDED   Patient taking differently: 1 mg.  TAKE one tablet BY MOUTH THREE TIMES DAILY AS NEEDED   PARoxetine (PAXIL) 20 mg tablet   No No   Sig: TAKE 1 TABLET BY MOUTH TWICE DAILY   clindamycin (CLEOCIN) 300 mg capsule   No No   Sig: Take 1 Cap by mouth four (4) times daily for 7 days. fluocinoNIDE (LIDEX) 0.05 % ointment   No No   Sig: Apply  to affected area two (2) times a day.   gabapentin (NEURONTIN) 300 mg capsule   No No   Sig: Take 1 Cap by mouth two (2) times a day. lamoTRIgine (LAMICTAL) 25 mg tablet   No No   Sig: TAKE one tablet BY MOUTH TWICE DAILY   Patient taking differently: 100 mg. TAKE one tablet BY MOUTH TWICE DAILY   lansoprazole (PREVACID) 30 mg capsule   No No   Sig: Take 1 Cap by mouth Daily (before breakfast). lisinopril-hydroCHLOROthiazide (PRINZIDE, ZESTORETIC) 20-25 mg per tablet   No No   Sig: TAKE 1 TABLET BY MOUTH DAILY   rosuvastatin (CRESTOR) 20 mg tablet   No No   Sig: TAKE 1 TABLET BY MOUTH EVERY NIGHT      Facility-Administered Medications: None         Adult Exam   General: alert, appeared to be in pain. Head: normocephalic, atraumatic  Palpable raised area over the RT posterior auricular. No obvious cellulitis noted. Tender to palpation. No facial cellulitis noted. ENT: moist mucous membranes. bilat TM cleared. No canal erythema   Neck:  full range of motion. No midline C/T/L spine tenderness or step off. No meningeal signs. Cardiovascular:   Respiratory: normal respirations  Gastrointestinal: soft, non-tender  Back: non-tender, full range of motion  Musculoskeletal: normal range of motion, normal strength, no gross deformities  Neurological: alert and oriented x 4, no gross focal deficits; normal speech  Psychiatric: cooperative; appropriate mood and affect    MDM: had a CT yesterday. No mass or bony involvement. Appear consistent with cellulitis. Ear looks fine. Canals are normal.  Do not suspect otitis media, mastoiditis. Bedside ultrasound appear consistent with cellulitis without abscess. Recommended to continue clindamycin. No worsening leukocytosis compared to previous lab.   Do not feel another emergent imaging is necessary tonight. We'll give pain medication and again recommend she follows up with general surgery for assessment and primary care for reassessment    Recent Results (from the past 12 hour(s))   CBC WITH AUTOMATED DIFF    Collection Time: 05/18/18  4:08 AM   Result Value Ref Range    WBC 10.8 4.3 - 11.1 K/uL    RBC 4.31 4.05 - 5.25 M/uL    HGB 12.6 11.7 - 15.4 g/dL    HCT 38.9 35.8 - 46.3 %    MCV 90.3 79.6 - 97.8 FL    MCH 29.2 26.1 - 32.9 PG    MCHC 32.4 31.4 - 35.0 g/dL    RDW 14.0 11.9 - 14.6 %    PLATELET 342 012 - 331 K/uL    MPV 9.9 (L) 10.8 - 14.1 FL    DF AUTOMATED      NEUTROPHILS 65 43 - 78 %    LYMPHOCYTES 26 13 - 44 %    MONOCYTES 7 4.0 - 12.0 %    EOSINOPHILS 2 0.5 - 7.8 %    BASOPHILS 0 0.0 - 2.0 %    IMMATURE GRANULOCYTES 0 0.0 - 5.0 %    ABS. NEUTROPHILS 7.0 1.7 - 8.2 K/UL    ABS. LYMPHOCYTES 2.8 0.5 - 4.6 K/UL    ABS. MONOCYTES 0.7 0.1 - 1.3 K/UL    ABS. EOSINOPHILS 0.2 0.0 - 0.8 K/UL    ABS. BASOPHILS 0.0 0.0 - 0.2 K/UL    ABS. IMM. GRANS. 0.0 0.0 - 0.5 K/UL   METABOLIC PANEL, COMPREHENSIVE    Collection Time: 05/18/18  4:08 AM   Result Value Ref Range    Sodium 141 136 - 145 mmol/L    Potassium 3.5 3.5 - 5.1 mmol/L    Chloride 105 98 - 107 mmol/L    CO2 28 21 - 32 mmol/L    Anion gap 8 7 - 16 mmol/L    Glucose 101 (H) 65 - 100 mg/dL    BUN 17 6 - 23 MG/DL    Creatinine 1.58 (H) 0.6 - 1.0 MG/DL    GFR est AA 46 (L) >60 ml/min/1.73m2    GFR est non-AA 38 (L) >60 ml/min/1.73m2    Calcium 8.6 8.3 - 10.4 MG/DL    Bilirubin, total 0.3 0.2 - 1.1 MG/DL    ALT (SGPT) 19 12 - 65 U/L    AST (SGOT) 15 15 - 37 U/L    Alk. phosphatase 60 50 - 136 U/L    Protein, total 6.5 6.3 - 8.2 g/dL    Albumin 3.4 (L) 3.5 - 5.0 g/dL    Globulin 3.1 2.3 - 3.5 g/dL    A-G Ratio 1.1 (L) 1.2 - 3.5           Dragon voice recognition software was used to create this note. Although the note has been reviewed and corrected where necessary, additional errors may have been overlooked and remain in the text.

## 2018-05-18 NOTE — ED TRIAGE NOTES
Pt presents to ER for abscess to R lateral head x 3 days that has increased in size since seen yesterday am.  Severe anxiety, took motrin w/o relief, already on Abx.

## 2018-05-18 NOTE — ED NOTES
I have reviewed discharge instructions with the patient. The patient verbalized understanding. Patient left ED via Discharge Method: ambulatory to Home with friend. Opportunity for questions and clarification provided. Patient given 1 scripts. To continue your aftercare when you leave the hospital, you may receive an automated call from our care team to check in on how you are doing. This is a free service and part of our promise to provide the best care and service to meet your aftercare needs.  If you have questions, or wish to unsubscribe from this service please call [phone]. Thank you for Choosing our [facility] Emergency Department.

## 2018-05-18 NOTE — Clinical Note
Complete course of antibiotic. Take pain medication as needed. Use topical pain medication. Follow up with Surgeon for further evaluation.

## 2018-05-18 NOTE — LETTER
NOTIFICATION RETURN TO WORK / SCHOOL 
 
5/18/2018 4:47 AM 
 
Ms. Cindi Grayson [address] [place], [place] [id number] To Whom It May Concern: 
 
Cindi Grayson is currently under the care of [facility] EMERGENCY DEPT. She will return to work/school on: 5/21/18 If there are questions or concerns please have the patient contact our office. Sincerely, Victorino Nicolas MD

## 2018-05-18 NOTE — DISCHARGE INSTRUCTIONS
Cellulitis: Care Instructions  Your Care Instructions    Cellulitis is a skin infection. It often occurs after a break in the skin from a scrape, cut, bite, or puncture, or after a rash. The doctor has checked you carefully, but problems can develop later. If you notice any problems or new symptoms, get medical treatment right away. Follow-up care is a key part of your treatment and safety. Be sure to make and go to all appointments, and call your doctor if you are having problems. It's also a good idea to know your test results and keep a list of the medicines you take. How can you care for yourself at home? · Take your antibiotics as directed. Do not stop taking them just because you feel better. You need to take the full course of antibiotics. · Prop up the infected area on pillows to reduce pain and swelling. Try to keep the area above the level of your heart as often as you can. · If your doctor told you how to care for your wound, follow your doctor's instructions. If you did not get instructions, follow this general advice:  ¨ Wash the wound with clean water 2 times a day. Don't use hydrogen peroxide or alcohol, which can slow healing. ¨ You may cover the wound with a thin layer of petroleum jelly, such as Vaseline, and a nonstick bandage. ¨ Apply more petroleum jelly and replace the bandage as needed. · Be safe with medicines. Take pain medicines exactly as directed. ¨ If the doctor gave you a prescription medicine for pain, take it as prescribed. ¨ If you are not taking a prescription pain medicine, ask your doctor if you can take an over-the-counter medicine. To prevent cellulitis in the future  · Try to prevent cuts, scrapes, or other injuries to your skin. Cellulitis most often occurs where there is a break in the skin. · If you get a scrape, cut, mild burn, or bite, wash the wound with clean water as soon as you can to help avoid infection.  Don't use hydrogen peroxide or alcohol, which can slow healing. · If you have swelling in your legs (edema), support stockings and good skin care may help prevent leg sores and cellulitis. · Take care of your feet, especially if you have diabetes or other conditions that increase the risk of infection. Wear shoes and socks. Do not go barefoot. If you have athlete's foot or other skin problems on your feet, talk to your doctor about how to treat them. When should you call for help? Call your doctor now or seek immediate medical care if:  ? · You have signs that your infection is getting worse, such as:  ¨ Increased pain, swelling, warmth, or redness. ¨ Red streaks leading from the area. ¨ Pus draining from the area. ¨ A fever. ? · You get a rash. ? Watch closely for changes in your health, and be sure to contact your doctor if:  ? · You are not getting better after 1 day (24 hours). ? · You do not get better as expected. Where can you learn more? Go to http://cori-zelda.info/. Michael Kaur in the search box to learn more about \"Cellulitis: Care Instructions. \"  Current as of: October 13, 2016  Content Version: 11.4  © 1819-2945 [employer]. Care instructions adapted under license by [employer] (which disclaims liability or warranty for this information). If you have questions about a medical condition or this instruction, always ask your healthcare professional. [place] [address] any warranty or liability for your use of this information.

## 2018-05-22 ENCOUNTER — ANESTHESIA EVENT (OUTPATIENT)
Dept: SURGERY | Age: 43
End: 2018-05-22
Payer: COMMERCIAL

## 2018-05-23 ENCOUNTER — HOSPITAL ENCOUNTER (OUTPATIENT)
Age: 43
Setting detail: OUTPATIENT SURGERY
Discharge: HOME | End: 2018-05-23
Attending: SURGERY | Admitting: SURGERY
Payer: COMMERCIAL

## 2018-05-23 ENCOUNTER — ANESTHESIA (OUTPATIENT)
Dept: SURGERY | Age: 43
End: 2018-05-23
Payer: COMMERCIAL

## 2018-05-23 VITALS
OXYGEN SATURATION: 95 % | HEART RATE: 68 BPM | TEMPERATURE: 98.4 F | RESPIRATION RATE: 18 BRPM | DIASTOLIC BLOOD PRESSURE: 57 MMHG | HEIGHT: 68 IN | SYSTOLIC BLOOD PRESSURE: 104 MMHG | BODY MASS INDEX: 28.25 KG/M2 | WEIGHT: 186.38 LBS

## 2018-05-23 DIAGNOSIS — L02.811 SCALP ABSCESS: Primary | ICD-10-CM

## 2018-05-23 PROCEDURE — 74011000250 HC RX REV CODE- 250

## 2018-05-23 PROCEDURE — 87205 SMEAR GRAM STAIN: CPT | Performed by: SURGERY

## 2018-05-23 PROCEDURE — 77030020143 HC AIRWY LARYN INTUB CGAS -A: Performed by: ANESTHESIOLOGY

## 2018-05-23 PROCEDURE — 77030011640 HC PAD GRND REM COVD -A: Performed by: SURGERY

## 2018-05-23 PROCEDURE — 74011250636 HC RX REV CODE- 250/636: Performed by: ANESTHESIOLOGY

## 2018-05-23 PROCEDURE — 87186 SC STD MICRODIL/AGAR DIL: CPT | Performed by: SURGERY

## 2018-05-23 PROCEDURE — 74011000258 HC RX REV CODE- 258: Performed by: SURGERY

## 2018-05-23 PROCEDURE — 87075 CULTR BACTERIA EXCEPT BLOOD: CPT | Performed by: SURGERY

## 2018-05-23 PROCEDURE — 76010000154 HC OR TIME FIRST 0.5 HR: Performed by: SURGERY

## 2018-05-23 PROCEDURE — 74011250636 HC RX REV CODE- 250/636

## 2018-05-23 PROCEDURE — 76210000016 HC OR PH I REC 1 TO 1.5 HR: Performed by: SURGERY

## 2018-05-23 PROCEDURE — 74011250636 HC RX REV CODE- 250/636: Performed by: SURGERY

## 2018-05-23 PROCEDURE — 87077 CULTURE AEROBIC IDENTIFY: CPT | Performed by: SURGERY

## 2018-05-23 PROCEDURE — 76060000032 HC ANESTHESIA 0.5 TO 1 HR: Performed by: SURGERY

## 2018-05-23 PROCEDURE — 74011000250 HC RX REV CODE- 250: Performed by: SURGERY

## 2018-05-23 PROCEDURE — 74011250637 HC RX REV CODE- 250/637: Performed by: ANESTHESIOLOGY

## 2018-05-23 PROCEDURE — 77030020782 HC GWN BAIR PAWS FLX 3M -B: Performed by: ANESTHESIOLOGY

## 2018-05-23 RX ORDER — ONDANSETRON 2 MG/ML
INJECTION INTRAMUSCULAR; INTRAVENOUS AS NEEDED
Status: DISCONTINUED | OUTPATIENT
Start: 2018-05-23 | End: 2018-05-23 | Stop reason: HOSPADM

## 2018-05-23 RX ORDER — HYDROMORPHONE HYDROCHLORIDE 2 MG/ML
0.5 INJECTION, SOLUTION INTRAMUSCULAR; INTRAVENOUS; SUBCUTANEOUS
Status: DISCONTINUED | OUTPATIENT
Start: 2018-05-23 | End: 2018-05-23 | Stop reason: HOSPADM

## 2018-05-23 RX ORDER — LIDOCAINE HYDROCHLORIDE 10 MG/ML
0.1 INJECTION INFILTRATION; PERINEURAL AS NEEDED
Status: DISCONTINUED | OUTPATIENT
Start: 2018-05-23 | End: 2018-05-23 | Stop reason: HOSPADM

## 2018-05-23 RX ORDER — OXYCODONE HYDROCHLORIDE 5 MG/1
5 TABLET ORAL
Status: DISCONTINUED | OUTPATIENT
Start: 2018-05-23 | End: 2018-05-23 | Stop reason: HOSPADM

## 2018-05-23 RX ORDER — DEXAMETHASONE SODIUM PHOSPHATE 4 MG/ML
INJECTION, SOLUTION INTRA-ARTICULAR; INTRALESIONAL; INTRAMUSCULAR; INTRAVENOUS; SOFT TISSUE AS NEEDED
Status: DISCONTINUED | OUTPATIENT
Start: 2018-05-23 | End: 2018-05-23 | Stop reason: HOSPADM

## 2018-05-23 RX ORDER — HYDROCODONE BITARTRATE AND ACETAMINOPHEN 5; 325 MG/1; MG/1
1 TABLET ORAL
Qty: 20 TAB | Refills: 0 | Status: ON HOLD | OUTPATIENT
Start: 2018-05-23 | End: 2018-07-01

## 2018-05-23 RX ORDER — ONDANSETRON 2 MG/ML
4 INJECTION INTRAMUSCULAR; INTRAVENOUS ONCE
Status: DISCONTINUED | OUTPATIENT
Start: 2018-05-23 | End: 2018-05-23 | Stop reason: HOSPADM

## 2018-05-23 RX ORDER — SODIUM CHLORIDE, SODIUM LACTATE, POTASSIUM CHLORIDE, CALCIUM CHLORIDE 600; 310; 30; 20 MG/100ML; MG/100ML; MG/100ML; MG/100ML
100 INJECTION, SOLUTION INTRAVENOUS CONTINUOUS
Status: DISCONTINUED | OUTPATIENT
Start: 2018-05-23 | End: 2018-05-23 | Stop reason: HOSPADM

## 2018-05-23 RX ORDER — MIDAZOLAM HYDROCHLORIDE 1 MG/ML
2 INJECTION, SOLUTION INTRAMUSCULAR; INTRAVENOUS ONCE
Status: DISCONTINUED | OUTPATIENT
Start: 2018-05-23 | End: 2018-05-23 | Stop reason: HOSPADM

## 2018-05-23 RX ORDER — OXYCODONE HYDROCHLORIDE 5 MG/1
10 TABLET ORAL
Status: DISCONTINUED | OUTPATIENT
Start: 2018-05-23 | End: 2018-05-23 | Stop reason: HOSPADM

## 2018-05-23 RX ORDER — DIPHENHYDRAMINE HYDROCHLORIDE 50 MG/ML
12.5 INJECTION, SOLUTION INTRAMUSCULAR; INTRAVENOUS
Status: DISCONTINUED | OUTPATIENT
Start: 2018-05-23 | End: 2018-05-23 | Stop reason: HOSPADM

## 2018-05-23 RX ORDER — FENTANYL CITRATE 50 UG/ML
INJECTION, SOLUTION INTRAMUSCULAR; INTRAVENOUS AS NEEDED
Status: DISCONTINUED | OUTPATIENT
Start: 2018-05-23 | End: 2018-05-23 | Stop reason: HOSPADM

## 2018-05-23 RX ORDER — LIDOCAINE HYDROCHLORIDE 20 MG/ML
INJECTION, SOLUTION EPIDURAL; INFILTRATION; INTRACAUDAL; PERINEURAL AS NEEDED
Status: DISCONTINUED | OUTPATIENT
Start: 2018-05-23 | End: 2018-05-23 | Stop reason: HOSPADM

## 2018-05-23 RX ORDER — PROPOFOL 10 MG/ML
INJECTION, EMULSION INTRAVENOUS AS NEEDED
Status: DISCONTINUED | OUTPATIENT
Start: 2018-05-23 | End: 2018-05-23 | Stop reason: HOSPADM

## 2018-05-23 RX ORDER — FENTANYL CITRATE 50 UG/ML
100 INJECTION, SOLUTION INTRAMUSCULAR; INTRAVENOUS ONCE
Status: DISCONTINUED | OUTPATIENT
Start: 2018-05-23 | End: 2018-05-23 | Stop reason: HOSPADM

## 2018-05-23 RX ORDER — NALOXONE HYDROCHLORIDE 0.4 MG/ML
0.1 INJECTION, SOLUTION INTRAMUSCULAR; INTRAVENOUS; SUBCUTANEOUS AS NEEDED
Status: DISCONTINUED | OUTPATIENT
Start: 2018-05-23 | End: 2018-05-23 | Stop reason: HOSPADM

## 2018-05-23 RX ORDER — ALBUTEROL SULFATE 0.83 MG/ML
2.5 SOLUTION RESPIRATORY (INHALATION) AS NEEDED
Status: DISCONTINUED | OUTPATIENT
Start: 2018-05-23 | End: 2018-05-23 | Stop reason: HOSPADM

## 2018-05-23 RX ORDER — BUPIVACAINE HYDROCHLORIDE AND EPINEPHRINE 5; 5 MG/ML; UG/ML
INJECTION, SOLUTION EPIDURAL; INTRACAUDAL; PERINEURAL AS NEEDED
Status: DISCONTINUED | OUTPATIENT
Start: 2018-05-23 | End: 2018-05-23 | Stop reason: HOSPADM

## 2018-05-23 RX ORDER — MIDAZOLAM HYDROCHLORIDE 1 MG/ML
2 INJECTION, SOLUTION INTRAMUSCULAR; INTRAVENOUS
Status: DISCONTINUED | OUTPATIENT
Start: 2018-05-23 | End: 2018-05-23 | Stop reason: HOSPADM

## 2018-05-23 RX ADMIN — FENTANYL CITRATE 25 MCG: 50 INJECTION, SOLUTION INTRAMUSCULAR; INTRAVENOUS at 14:50

## 2018-05-23 RX ADMIN — GENTAMICIN SULFATE 360 MG: 40 INJECTION, SOLUTION INTRAMUSCULAR; INTRAVENOUS at 14:46

## 2018-05-23 RX ADMIN — FENTANYL CITRATE 25 MCG: 50 INJECTION, SOLUTION INTRAMUSCULAR; INTRAVENOUS at 14:54

## 2018-05-23 RX ADMIN — LIDOCAINE HYDROCHLORIDE 100 MG: 20 INJECTION, SOLUTION EPIDURAL; INFILTRATION; INTRACAUDAL; PERINEURAL at 14:41

## 2018-05-23 RX ADMIN — PROPOFOL 200 MG: 10 INJECTION, EMULSION INTRAVENOUS at 14:41

## 2018-05-23 RX ADMIN — HYDROMORPHONE HYDROCHLORIDE 0.5 MG: 2 INJECTION, SOLUTION INTRAMUSCULAR; INTRAVENOUS; SUBCUTANEOUS at 15:23

## 2018-05-23 RX ADMIN — HYDROMORPHONE HYDROCHLORIDE 0.5 MG: 2 INJECTION, SOLUTION INTRAMUSCULAR; INTRAVENOUS; SUBCUTANEOUS at 16:01

## 2018-05-23 RX ADMIN — FENTANYL CITRATE 25 MCG: 50 INJECTION, SOLUTION INTRAMUSCULAR; INTRAVENOUS at 14:53

## 2018-05-23 RX ADMIN — CLINDAMYCIN PHOSPHATE 900 MG: 150 INJECTION, SOLUTION INTRAVENOUS at 13:01

## 2018-05-23 RX ADMIN — DEXAMETHASONE SODIUM PHOSPHATE 10 MG: 4 INJECTION, SOLUTION INTRA-ARTICULAR; INTRALESIONAL; INTRAMUSCULAR; INTRAVENOUS; SOFT TISSUE at 14:45

## 2018-05-23 RX ADMIN — ONDANSETRON 4 MG: 2 INJECTION INTRAMUSCULAR; INTRAVENOUS at 14:57

## 2018-05-23 RX ADMIN — HYDROMORPHONE HYDROCHLORIDE 0.5 MG: 2 INJECTION, SOLUTION INTRAMUSCULAR; INTRAVENOUS; SUBCUTANEOUS at 12:54

## 2018-05-23 RX ADMIN — FENTANYL CITRATE 25 MCG: 50 INJECTION, SOLUTION INTRAMUSCULAR; INTRAVENOUS at 14:51

## 2018-05-23 RX ADMIN — OXYCODONE HYDROCHLORIDE 10 MG: 5 TABLET ORAL at 15:23

## 2018-05-23 RX ADMIN — SODIUM CHLORIDE, SODIUM LACTATE, POTASSIUM CHLORIDE, AND CALCIUM CHLORIDE: 600; 310; 30; 20 INJECTION, SOLUTION INTRAVENOUS at 14:36

## 2018-05-23 NOTE — BRIEF OP NOTE
BRIEF OPERATIVE NOTE    Date of Procedure: 5/23/2018   Preoperative Diagnosis: Soft tissue abscess [L02.91]  Postoperative Diagnosis: Soft tissue abscess [L02.91]    Procedure(s):  RIGHT SCALP ABSCESS I&D  Surgeon(s) and Role:     * Anuel Astudillo MD - Primary         Surgical Staff:  Circ-1: José Miguel Rasmussen RN  Scrub Tech-1: Jose Miguel Obrien CNA  Event Time In   Incision Start  2:48 PM   Incision Close  2:57 PM     Anesthesia: General   Estimated Blood Loss: minimal  Specimens: * No specimens in log *   Findings: as above  Complications: none  Implants: * No implants in log *

## 2018-05-23 NOTE — DISCHARGE INSTRUCTIONS
Daily packing changes    OK to have shower and change to new packing after shower    After general anesthesia or intravenous sedation, for 24 hours or while taking prescription Narcotics:  · Limit your activities  · Do not drive and operate hazardous machinery  · Do not make important personal or business decisions  · Do  not drink alcoholic beverages  · If you have not urinated within 8 hours after discharge, please contact your surgeon on call. *  Please give a list of your current medications to your Primary Care Provider. *  Please update this list whenever your medications are discontinued, doses are      changed, or new medications (including over-the-counter products) are added. *  Please carry medication information at all times in case of emergency situations. These are general instructions for a healthy lifestyle:  No smoking/ No tobacco products/ Avoid exposure to second hand smoke  Surgeon General's Warning:  Quitting smoking now greatly reduces serious risk to your health. Obesity, smoking, and sedentary lifestyle greatly increases your risk for illness  A healthy diet, regular physical exercise & weight monitoring are important for maintaining a healthy lifestyle    You may be retaining fluid if you have a history of heart failure or if you experience any of the following symptoms:  Weight gain of 3 pounds or more overnight or 5 pounds in a week, increased swelling in our hands or feet or shortness of breath while lying flat in bed. Please call your doctor as soon as you notice any of these symptoms; do not wait until your next office visit. Recognize signs and symptoms of STROKE:  F-face looks uneven  A-arms unable to move or move unevenly  S-speech slurred or non-existent  T-time-call 911 as soon as signs and symptoms begin-DO NOT go       Back to bed or wait to see if you get better-TIME IS BRAIN.

## 2018-05-23 NOTE — IP AVS SNAPSHOT
[address] [address] 
[phone] Patient: Ada Arredondo MRN: [id number] :1975 About your hospitalization You were admitted on:  May 23, 2018 You last received care in the:  [facility] PACU You were discharged on:  May 23, 2018 Why you were hospitalized Your primary diagnosis was:  Not on File Follow-up Information Follow up With Details Comments Contact Info Jh Leach NP   [address] 
[phone] Juan Marcus MD   [address] [facility]  
[facility] [address] [facility] [address] 
[phone] Your Scheduled Appointments 2018 10:00 AM EDT Follow Up with Jh Leach  N [facility] ([address] [place] [address]) [address] [place] [id number]  
[phone] 2018 10:00 AM EDT Follow Up with Jh Leach  N [facility] ([address] [place] [address]) [address] [place] [address]  
[phone] Discharge Orders None A check brady indicates which time of day the medication should be taken. My Medications CHANGE how you take these medications Instructions Each Dose to Equal  
 Morning Noon Evening Bedtime  
 clindamycin 300 mg capsule Commonly known as:  CLEOCIN What changed:  additional instructions Your last dose was: Your next dose is: Take 1 Cap by mouth four (4) times daily for 7 days. 300 mg HYDROcodone-acetaminophen 5-325 mg per tablet Commonly known as:  Ruffus Homme What changed:  See the new instructions. Your last dose was: Your next dose is: Take 1 Tab by mouth every four (4) hours as needed for Pain. Max Daily Amount: 6 Tabs. 1 Tab  
    
   
   
   
  
 lamoTRIgine 25 mg tablet Commonly known as:   LaMICtal  
 What changed:   
- how much to take 
- additional instructions Your last dose was: Your next dose is: TAKE one tablet BY MOUTH TWICE DAILY LORazepam 0.5 mg tablet Commonly known as:  ATIVAN What changed:   
- how much to take 
- how to take this - when to take this 
- reasons to take this 
- additional instructions Your last dose was: Your next dose is: TAKE one tablet BY MOUTH THREE TIMES DAILY AS NEEDED CONTINUE taking these medications Instructions Each Dose to Equal  
 Morning Noon Evening Bedtime  
 gabapentin 300 mg capsule Commonly known as:  NEURONTIN Your last dose was: Your next dose is: Take 1 Cap by mouth two (2) times a day. 300 mg  
    
   
   
   
  
 lansoprazole 30 mg capsule Commonly known as:  PREVACID Your last dose was: Your next dose is: Take 1 Cap by mouth Daily (before breakfast). 30 mg  
    
   
   
   
  
 lisinopril-hydroCHLOROthiazide 20-25 mg per tablet Commonly known as:  Marlaine Darryn Your last dose was: Your next dose is: TAKE 1 TABLET BY MOUTH DAILY PARoxetine 20 mg tablet Commonly known as:  PAXIL Your last dose was: Your next dose is: TAKE 1 TABLET BY MOUTH TWICE DAILY  
     
   
   
   
  
 rosuvastatin 20 mg tablet Commonly known as:  CRESTOR Your last dose was: Your next dose is: TAKE 1 TABLET BY MOUTH EVERY NIGHT  
     
   
   
   
  
 tiZANidine 4 mg tablet Commonly known as:  Fay Cha Your last dose was: Your next dose is: Take 1 Tab by mouth three (3) times daily. 4 mg Where to Get Your Medications Information on where to get these meds will be given to you by the nurse or doctor. ! Ask your nurse or doctor about these medications HYDROcodone-acetaminophen 5-325 mg per tablet Opioid Education Prescription Opioids: What You Need to Know: 
 
Prescription opioids can be used to help relieve moderate-to-severe pain and are often prescribed following a surgery or injury, or for certain health conditions. These medications can be an important part of treatment but also come with serious risks. Opioids are strong pain medicines. Examples include hydrocodone, oxycodone, fentanyl, and morphine. Heroin is an example of an illegal opioid. It is important to work with your health care provider to make sure you are getting the safest, most effective care. WHAT ARE THE RISKS AND SIDE EFFECTS OF OPIOID USE? Prescription opioids carry serious risks of addiction and overdose, especially with prolonged use. An opioid overdose, often marked by slow breathing, can cause sudden death. The use of prescription opioids can have a number of side effects as well, even when taken as directed. · Tolerance-meaning you might need to take more of a medication for the same pain relief · Physical dependence-meaning you have symptoms of withdrawal when the medication is stopped. Withdrawal symptoms can include nausea, sweating, chills, diarrhea, stomach cramps, and muscle aches. Withdrawal can last up to several weeks, depending on which drug you took and how long you took it. · Increased sensitivity to pain · Constipation · Nausea, vomiting, and dry mouth · Sleepiness and dizziness · Confusion · Depression · Low levels of testosterone that can result in lower sex drive, energy, and strength · Itching and sweating RISKS ARE GREATER WITH:      
· History of drug misuse, substance use disorder, or overdose · Mental health conditions (such as depression or anxiety) · Sleep apnea · Older age (72 years or older) · Pregnancy Avoid alcohol while taking prescription opioids. Also, unless specifically advised by your health care provider, medications to avoid include: · Benzodiazepines (such as Xanax or Valium) · Muscle relaxants (such as Soma or Flexeril) · Hypnotics (such as Ambien or Lunesta) · Other prescription opioids KNOW YOUR OPTIONS Talk to your health care provider about ways to manage your pain that don't involve prescription opioids. Some of these options may actually work better and have fewer risks and side effects. Options may include: 
· Pain relievers such as acetaminophen, ibuprofen, and naproxen · Some medications that are also used for depression or seizures · Physical therapy and exercise · Counseling to help patients learn how to cope better with triggers of pain and stress. · Application of heat or cold compress · Massage therapy · Relaxation techniques Be Informed Make sure you know the name of your medication, how much and how often to take it, and its potential risks & side effects. IF YOU ARE PRESCRIBED OPIOIDS FOR PAIN: 
· Never take opioids in greater amounts or more often than prescribed. Remember the goal is not to be pain-free but to manage your pain at a tolerable level. · Follow up with your primary care provider to: · Work together to create a plan on how to manage your pain. · Talk about ways to help manage your pain that don't involve prescription opioids. · Talk about any and all concerns and side effects. · Help prevent misuse and abuse. · Never sell or share prescription opioids · Help prevent misuse and abuse. · Store prescription opioids in a secure place and out of reach of others (this may include visitors, children, friends, and family).  
· Safely dispose of unused/unwanted prescription opioids: Find your community drug take-back program or your pharmacy mail-back program, or flush them down the toilet, following guidance from the Food and Drug Administration (www.fda.gov/Drugs/ResourcesForYou). · Visit www.cdc.gov/drugoverdose to learn about the risks of opioid abuse and overdose. · If you believe you may be struggling with addiction, tell your health care provider and ask for guidance or call Heidi Arcos at [phone]. Discharge Instructions Daily packing changes OK to have shower and change to new packing after shower After general anesthesia or intravenous sedation, for 24 hours or while taking prescription Narcotics: · Limit your activities · Do not drive and operate hazardous machinery · Do not make important personal or business decisions · Do  not drink alcoholic beverages · If you have not urinated within 8 hours after discharge, please contact your surgeon on call. *  Please give a list of your current medications to your Primary Care Provider. *  Please update this list whenever your medications are discontinued, doses are 
    changed, or new medications (including over-the-counter products) are added. *  Please carry medication information at all times in case of emergency situations. These are general instructions for a healthy lifestyle: No smoking/ No tobacco products/ Avoid exposure to second hand smoke Surgeon General's Warning:  Quitting smoking now greatly reduces serious risk to your health. Obesity, smoking, and sedentary lifestyle greatly increases your risk for illness A healthy diet, regular physical exercise & weight monitoring are important for maintaining a healthy lifestyle You may be retaining fluid if you have a history of heart failure or if you experience any of the following symptoms:  Weight gain of 3 pounds or more overnight or 5 pounds in a week, increased swelling in our hands or feet or shortness of breath while lying flat in bed.   Please call your doctor as soon as you notice any of these symptoms; do not wait until your next office visit. Recognize signs and symptoms of STROKE: 
F-face looks uneven A-arms unable to move or move unevenly S-speech slurred or non-existent T-time-call 911 as soon as signs and symptoms begin-DO NOT go Back to bed or wait to see if you get better-TIME IS BRAIN. Introducing [facility] & HEALTH SERVICES! Earline Zarco introduces Bin1 ATE patient portal. Now you can access parts of your medical record, email your doctor's office, and request medication refills online. 1. In your internet browser, go to [email or web address] Ocean 2. Click on the First Time User? Click Here link in the Sign In box. You will see the New Member Sign Up page. 3. Enter your Bin1 ATE Access Code exactly as it appears below. You will not need to use this code after youve completed the sign-up process. If you do not sign up before the expiration date, you must request a new code. · Bin1 ATE Access Code: [email or web address] Expires: 6/23/2018  8:03 PM 
 
4. Enter the last four digits of your Social Security Number (xxxx) and Date of Birth (mm/dd/yyyy) as indicated and click Submit. You will be taken to the next sign-up page. 5. Create a Bin1 ATE ID. This will be your Bin1 ATE login ID and cannot be changed, so think of one that is secure and easy to remember. 6. Create a Bin1 ATE password. You can change your password at any time. 7. Enter your Password Reset Question and Answer. This can be used at a later time if you forget your password. 8. Enter your e-mail address. You will receive e-mail notification when new information is available in [address] 9. Click Sign Up. You can now view and download portions of your medical record. 10. Click the Download Summary menu link to download a portable copy of your medical information.  
 
If you have questions, please visit the Frequently Asked Questions section of the [employer]. Remember, MyChart is NOT to be used for urgent needs. For medical emergencies, dial 911. Now available from your iPhone and Android! Introducing Patrick Lorenzana As a [employer] patient, I wanted to make you aware of our electronic visit tool called Patrick Lorenzana. [employer] 24/7 allows you to connect within minutes with a medical provider 24 hours a day, seven days a week via a mobile device or tablet or logging into a secure website from your computer. You can access Patrick Lorenzana from anywhere in the [place]. A virtual visit might be right for you when you have a simple condition and feel like you just dont want to get out of bed, or cant get away from work for an appointment, when your regular [employer] provider is not available (evenings, weekends or holidays), or when youre out of town and need minor care. Electronic visits cost only $49 and if the [employer] 24/7 provider determines a prescription is needed to treat your condition, one can be electronically transmitted to a nearby pharmacy*. Please take a moment to enroll today if you have not already done so. The enrollment process is free and takes just a few minutes. To enroll, please download the [employer] 24/7 jake to your tablet or phone, or visit [email or web address] to enroll on your computer. And, as an [address] [place] [address] patient with a [employer] account, the results of your visits will be scanned into your electronic medical record and your primary care provider will be able to view the scanned results. We urge you to continue to see your regular [employer] provider for your ongoing medical care. And while your primary care provider may not be the one available when you seek a Patrick Lorenzana virtual visit, the peace of mind you get from getting a real diagnosis real time can be priceless. For more information on Patrick Jimenezrommelfin, view our Frequently Asked Questions (FAQs) at [email or web address]. Sincerely, 
 
Rob Velez MD 
Chief Medical Officer [employer] *:  certain medications cannot be prescribed via Patrick Tonykeenan Unresulted Labs-Please follow up with your PCP about these lab tests Order Current Status CULTURE, ANAEROBIC In process CULTURE, WOUND W GRAM STAIN In process Providers Seen During Your Hospitalization Provider Specialty Primary office phone Bridgett Carpenter MD General Surgery [phone] Your Primary Care Physician (PCP) Primary Care Physician Office Phone Office Fax Amina Weaver [phone] You are allergic to the following Allergen Reactions Erythromycin Hives Nausea and Vomiting Novocain (Procaine) Hives Pcn (Penicillins) Anaphylaxis Phenobarbital Unknown (comments) Sulfa (Sulfonamide Antibiotics) Hives Sulfur Hives Tetracycline Hives Recent Documentation Height Weight BMI OB Status Smoking Status 1.727 m 84.5 kg 28.34 kg/m2 Having regular periods Current Every Day Smoker Emergency Contacts Name Discharge Info Relation Home Work Mobile Jonathan Curiel DISCHARGE CAREGIVER [3] Spouse [3] [phone] [place] CAREGIVER [3] Other Relative [6] [phone] Patient Belongings The following personal items are in your possession at time of discharge: 
  Dental Appliances: Uppers  Visual Aid: Glasses, At home      Home Medications: None   Jewelry: Ring  Clothing: Shirt, Pants, Footwear    Other Valuables: None Please provide this summary of care documentation to your next provider. Signatures-by signing, you are acknowledging that this After Visit Summary has been reviewed with you and you have received a copy. Patient Signature:  ____________________________________________________________ Date:  ____________________________________________________________  
  
Dewane Salen Provider Signature:  ____________________________________________________________ Date:  ____________________________________________________________

## 2018-05-23 NOTE — ANESTHESIA POSTPROCEDURE EVALUATION
Post-Anesthesia Evaluation and Assessment    Patient: Kiet Chappell MRN: [id number]  SSN: [id number]    YOB: 1975  Age: 43 y.o. Sex: female       Cardiovascular Function/Vital Signs  Visit Vitals    BP (P) 92/49 (BP 1 Location: Right arm, BP Patient Position: At rest)    Pulse 79    Temp 36.6 °C (97.8 °F)    Resp 16    Ht 5' 8\" (1.727 m)    Wt 84.5 kg (186 lb 6 oz)    SpO2 94%    BMI 28.34 kg/m2       Patient is status post general anesthesia for Procedure(s):  RIGHT SCALP ABSCESS. Nausea/Vomiting: None    Postoperative hydration reviewed and adequate. Pain:  Pain Scale 1: Numeric (0 - 10) (05/23/18 1601)  Pain Intensity 1: 9 (05/23/18 1601)   Managed    Neurological Status:   Neuro (WDL): (P) Within Defined Limits (05/23/18 1508)  Neuro  LUE Motor Response: (P) Purposeful (05/23/18 1508)  LLE Motor Response: (P) Purposeful (05/23/18 1508)  RUE Motor Response: (P) Purposeful (05/23/18 1508)  RLE Motor Response: (P) Purposeful (05/23/18 1508)   At baseline    Mental Status and Level of Consciousness: Arousable    Pulmonary Status:   O2 Device: Nasal cannula (05/23/18 1508)   Adequate oxygenation and airway patent    Complications related to anesthesia: None    Post-anesthesia assessment completed.  No concerns    Signed By: Luke Trinidad MD     May 23, 2018

## 2018-05-23 NOTE — ANESTHESIA PREPROCEDURE EVALUATION
Anesthetic History               Review of Systems / Medical History  Patient summary reviewed, nursing notes reviewed and pertinent labs reviewed    Pulmonary                   Neuro/Psych              Cardiovascular    Hypertension: well controlled              Exercise tolerance: >4 METS     GI/Hepatic/Renal         Renal disease: CRI       Endo/Other             Other Findings              Physical Exam    Airway  Mallampati: II  TM Distance: 4 - 6 cm  Neck ROM: normal range of motion   Mouth opening: Normal     Cardiovascular  Regular rate and rhythm,  S1 and S2 normal,  no murmur, click, rub, or gallop             Dental    Dentition: Full upper dentures and Edentulous     Pulmonary  Breath sounds clear to auscultation               Abdominal         Other Findings            Anesthetic Plan    ASA: 3  Anesthesia type: general          Induction: Intravenous  Anesthetic plan and risks discussed with: Patient

## 2018-05-24 NOTE — OP NOTES
[facility] REPORT    Mary Gaxiola  MR#: [id number]  : 1975  ACCOUNT #: [de-identified]   DATE OF SERVICE: 2018    SURGEON:  Bridgett Carpenter MD     PREOPERATIVE DIAGNOSIS:  Right scalp abscess. POSTOPERATIVE DIAGNOSIS:  Right scalp abscess. PROCEDURE PERFORMED:  Right scalp abscess I and D. ANESTHESIA: General    SPECIMENS REMOVED: pus    COMPLICATIONS: None    IMPLANTS: packing    INDICATION:  This is a 42-year-old female with abscess in the right posterior auricular region inside of the hairline. She has been on oral antibiotics but her abscess persists and surgical I and D was offered to her. She understood the risks and benefits and agreed to proceed. FINDINGS:  This is an abscess with a small amount of pus in it. PROCEDURE:  After informed consent obtained, the patient was brought into the operating room, left in supine position. General anesthesia was administered. Her hair was shaved partially to expose the abscess, then Betadine was used and was draped in usual routine fashion. The scalpel was used and the abscess was opened and the pus was immediately drained. I then used hemostat and the pocket was completely connected. Hemostasis obtained with Bovie and packing was placed. 10 mL of local anesthetic were used for postop pain control. Patient tolerated the procedure well, was transferred to recovery room in stable condition. All instrument count and lap count correct. ESTIMATED BLOOD LOSS:  Minimum.       Moi Bowden MD       BY / GAEL  D: 2018 15:08     T: 2018 16:15  JOB #: [id number]

## 2018-05-26 LAB
BACTERIA SPEC CULT: ABNORMAL
GRAM STN SPEC: ABNORMAL
GRAM STN SPEC: ABNORMAL
SERVICE CMNT-IMP: ABNORMAL

## 2018-05-30 LAB
BACTERIA SPEC CULT: NORMAL
SERVICE CMNT-IMP: NORMAL

## 2018-06-28 ENCOUNTER — HOSPITAL ENCOUNTER (INPATIENT)
Age: 43
LOS: 2 days | Discharge: HOME | DRG: 312 | End: 2018-07-01
Attending: EMERGENCY MEDICINE | Admitting: INTERNAL MEDICINE
Payer: COMMERCIAL

## 2018-06-28 ENCOUNTER — APPOINTMENT (OUTPATIENT)
Dept: GENERAL RADIOLOGY | Age: 43
DRG: 312 | End: 2018-06-28
Attending: EMERGENCY MEDICINE
Payer: COMMERCIAL

## 2018-06-28 ENCOUNTER — APPOINTMENT (OUTPATIENT)
Dept: CT IMAGING | Age: 43
DRG: 312 | End: 2018-06-28
Attending: EMERGENCY MEDICINE
Payer: COMMERCIAL

## 2018-06-28 DIAGNOSIS — L02.811 SCALP ABSCESS: ICD-10-CM

## 2018-06-28 DIAGNOSIS — I95.0 IDIOPATHIC HYPOTENSION: Primary | ICD-10-CM

## 2018-06-28 DIAGNOSIS — N17.9 ACUTE KIDNEY INJURY (HCC): ICD-10-CM

## 2018-06-28 DIAGNOSIS — R51.9 OCCIPITAL HEADACHE: ICD-10-CM

## 2018-06-28 PROBLEM — I95.9 HYPOTENSION: Status: ACTIVE | Noted: 2018-06-28

## 2018-06-28 PROBLEM — E87.6 HYPOKALEMIA: Status: ACTIVE | Noted: 2018-06-28

## 2018-06-28 LAB
ALBUMIN SERPL-MCNC: 3.6 G/DL (ref 3.5–5)
ALBUMIN/GLOB SERPL: 1.1 {RATIO} (ref 1.2–3.5)
ALP SERPL-CCNC: 68 U/L (ref 50–136)
ALT SERPL-CCNC: 23 U/L (ref 12–65)
ANION GAP SERPL CALC-SCNC: 9 MMOL/L (ref 7–16)
AST SERPL-CCNC: 18 U/L (ref 15–37)
BASOPHILS # BLD: 0 K/UL (ref 0–0.2)
BASOPHILS NFR BLD: 0 % (ref 0–2)
BILIRUB SERPL-MCNC: 0.4 MG/DL (ref 0.2–1.1)
BNP SERPL-MCNC: 4 PG/ML
BUN SERPL-MCNC: 24 MG/DL (ref 6–23)
CALCIUM SERPL-MCNC: 8.7 MG/DL (ref 8.3–10.4)
CHLORIDE SERPL-SCNC: 98 MMOL/L (ref 98–107)
CO2 SERPL-SCNC: 30 MMOL/L (ref 21–32)
CREAT SERPL-MCNC: 2.02 MG/DL (ref 0.6–1)
DIFFERENTIAL METHOD BLD: ABNORMAL
EOSINOPHIL # BLD: 0.1 K/UL (ref 0–0.8)
EOSINOPHIL NFR BLD: 2 % (ref 0.5–7.8)
ERYTHROCYTE [DISTWIDTH] IN BLOOD BY AUTOMATED COUNT: 14 % (ref 11.9–14.6)
GLOBULIN SER CALC-MCNC: 3.4 G/DL (ref 2.3–3.5)
GLUCOSE SERPL-MCNC: 88 MG/DL (ref 65–100)
HCT VFR BLD AUTO: 38.4 % (ref 35.8–46.3)
HGB BLD-MCNC: 13.2 G/DL (ref 11.7–15.4)
IMM GRANULOCYTES # BLD: 0 K/UL (ref 0–0.5)
IMM GRANULOCYTES NFR BLD AUTO: 0 % (ref 0–5)
LACTATE BLD-SCNC: 0.5 MMOL/L (ref 0.5–1.9)
LYMPHOCYTES # BLD: 2.6 K/UL (ref 0.5–4.6)
LYMPHOCYTES NFR BLD: 30 % (ref 13–44)
MAGNESIUM SERPL-MCNC: 2.4 MG/DL (ref 1.8–2.4)
MCH RBC QN AUTO: 31.1 PG (ref 26.1–32.9)
MCHC RBC AUTO-ENTMCNC: 34.4 G/DL (ref 31.4–35)
MCV RBC AUTO: 90.4 FL (ref 79.6–97.8)
MONOCYTES # BLD: 0.6 K/UL (ref 0.1–1.3)
MONOCYTES NFR BLD: 7 % (ref 4–12)
NEUTS SEG # BLD: 5.3 K/UL (ref 1.7–8.2)
NEUTS SEG NFR BLD: 61 % (ref 43–78)
PLATELET # BLD AUTO: 252 K/UL (ref 150–450)
PMV BLD AUTO: 10 FL (ref 10.8–14.1)
POTASSIUM SERPL-SCNC: 3.2 MMOL/L (ref 3.5–5.1)
PROT SERPL-MCNC: 7 G/DL (ref 6.3–8.2)
RBC # BLD AUTO: 4.25 M/UL (ref 4.05–5.25)
SODIUM SERPL-SCNC: 137 MMOL/L (ref 136–145)
TROPONIN I BLD-MCNC: 0 NG/ML (ref 0.02–0.05)
TROPONIN I SERPL-MCNC: <0.04 NG/ML (ref 0.02–0.05)
WBC # BLD AUTO: 8.7 K/UL (ref 4.3–11.1)

## 2018-06-28 PROCEDURE — 74011250637 HC RX REV CODE- 250/637: Performed by: EMERGENCY MEDICINE

## 2018-06-28 PROCEDURE — 93005 ELECTROCARDIOGRAM TRACING: CPT | Performed by: EMERGENCY MEDICINE

## 2018-06-28 PROCEDURE — 80053 COMPREHEN METABOLIC PANEL: CPT | Performed by: EMERGENCY MEDICINE

## 2018-06-28 PROCEDURE — 74011250636 HC RX REV CODE- 250/636: Performed by: EMERGENCY MEDICINE

## 2018-06-28 PROCEDURE — 96374 THER/PROPH/DIAG INJ IV PUSH: CPT | Performed by: EMERGENCY MEDICINE

## 2018-06-28 PROCEDURE — 87040 BLOOD CULTURE FOR BACTERIA: CPT | Performed by: EMERGENCY MEDICINE

## 2018-06-28 PROCEDURE — 99285 EMERGENCY DEPT VISIT HI MDM: CPT | Performed by: EMERGENCY MEDICINE

## 2018-06-28 PROCEDURE — 87077 CULTURE AEROBIC IDENTIFY: CPT | Performed by: EMERGENCY MEDICINE

## 2018-06-28 PROCEDURE — 83605 ASSAY OF LACTIC ACID: CPT

## 2018-06-28 PROCEDURE — 70450 CT HEAD/BRAIN W/O DYE: CPT

## 2018-06-28 PROCEDURE — 96375 TX/PRO/DX INJ NEW DRUG ADDON: CPT | Performed by: EMERGENCY MEDICINE

## 2018-06-28 PROCEDURE — 71045 X-RAY EXAM CHEST 1 VIEW: CPT

## 2018-06-28 PROCEDURE — 83735 ASSAY OF MAGNESIUM: CPT | Performed by: EMERGENCY MEDICINE

## 2018-06-28 PROCEDURE — 77030020263 HC SOL INJ SOD CL0.9% LFCR 1000ML

## 2018-06-28 PROCEDURE — 87186 SC STD MICRODIL/AGAR DIL: CPT | Performed by: EMERGENCY MEDICINE

## 2018-06-28 PROCEDURE — 85025 COMPLETE CBC W/AUTO DIFF WBC: CPT | Performed by: EMERGENCY MEDICINE

## 2018-06-28 PROCEDURE — 87205 SMEAR GRAM STAIN: CPT | Performed by: EMERGENCY MEDICINE

## 2018-06-28 PROCEDURE — 83880 ASSAY OF NATRIURETIC PEPTIDE: CPT | Performed by: EMERGENCY MEDICINE

## 2018-06-28 PROCEDURE — 96361 HYDRATE IV INFUSION ADD-ON: CPT | Performed by: EMERGENCY MEDICINE

## 2018-06-28 PROCEDURE — 84484 ASSAY OF TROPONIN QUANT: CPT | Performed by: EMERGENCY MEDICINE

## 2018-06-28 PROCEDURE — 84145 PROCALCITONIN (PCT): CPT | Performed by: EMERGENCY MEDICINE

## 2018-06-28 PROCEDURE — 77030033269 HC SLV COMPR SCD KNE2 CARD -B

## 2018-06-28 RX ORDER — TIZANIDINE 2 MG/1
4 TABLET ORAL 3 TIMES DAILY
Status: DISCONTINUED | OUTPATIENT
Start: 2018-06-29 | End: 2018-07-01 | Stop reason: HOSPADM

## 2018-06-28 RX ORDER — ONDANSETRON 2 MG/ML
4 INJECTION INTRAMUSCULAR; INTRAVENOUS
Status: COMPLETED | OUTPATIENT
Start: 2018-06-28 | End: 2018-06-28

## 2018-06-28 RX ORDER — HYDROCODONE BITARTRATE AND ACETAMINOPHEN 7.5; 325 MG/1; MG/1
1 TABLET ORAL
Status: COMPLETED | OUTPATIENT
Start: 2018-06-28 | End: 2018-06-28

## 2018-06-28 RX ORDER — PANTOPRAZOLE SODIUM 40 MG/1
40 TABLET, DELAYED RELEASE ORAL
Status: DISCONTINUED | OUTPATIENT
Start: 2018-06-29 | End: 2018-07-01 | Stop reason: HOSPADM

## 2018-06-28 RX ORDER — KETOROLAC TROMETHAMINE 30 MG/ML
30 INJECTION, SOLUTION INTRAMUSCULAR; INTRAVENOUS
Status: COMPLETED | OUTPATIENT
Start: 2018-06-28 | End: 2018-06-28

## 2018-06-28 RX ORDER — HYDROCODONE BITARTRATE AND ACETAMINOPHEN 5; 325 MG/1; MG/1
1 TABLET ORAL
Status: DISCONTINUED | OUTPATIENT
Start: 2018-06-28 | End: 2018-07-01 | Stop reason: HOSPADM

## 2018-06-28 RX ORDER — ROSUVASTATIN CALCIUM 20 MG/1
20 TABLET, COATED ORAL
Status: DISCONTINUED | OUTPATIENT
Start: 2018-06-29 | End: 2018-07-01 | Stop reason: HOSPADM

## 2018-06-28 RX ORDER — GABAPENTIN 300 MG/1
300 CAPSULE ORAL 2 TIMES DAILY
Status: DISCONTINUED | OUTPATIENT
Start: 2018-06-29 | End: 2018-07-01 | Stop reason: HOSPADM

## 2018-06-28 RX ORDER — LORAZEPAM 1 MG/1
1 TABLET ORAL
Status: DISCONTINUED | OUTPATIENT
Start: 2018-06-28 | End: 2018-07-01 | Stop reason: HOSPADM

## 2018-06-28 RX ORDER — LAMOTRIGINE 100 MG/1
100 TABLET ORAL 2 TIMES DAILY
Status: DISCONTINUED | OUTPATIENT
Start: 2018-06-29 | End: 2018-07-01 | Stop reason: HOSPADM

## 2018-06-28 RX ORDER — POTASSIUM CHLORIDE 20 MEQ/1
40 TABLET, EXTENDED RELEASE ORAL
Status: COMPLETED | OUTPATIENT
Start: 2018-06-28 | End: 2018-06-29

## 2018-06-28 RX ORDER — PAROXETINE HYDROCHLORIDE 20 MG/1
20 TABLET, FILM COATED ORAL 2 TIMES DAILY
Status: DISCONTINUED | OUTPATIENT
Start: 2018-06-29 | End: 2018-07-01 | Stop reason: HOSPADM

## 2018-06-28 RX ADMIN — SODIUM CHLORIDE 1000 ML: 900 INJECTION, SOLUTION INTRAVENOUS at 22:25

## 2018-06-28 RX ADMIN — SODIUM CHLORIDE 1000 ML: 900 INJECTION, SOLUTION INTRAVENOUS at 22:06

## 2018-06-28 RX ADMIN — HYDROCODONE BITARTRATE AND ACETAMINOPHEN 1 TABLET: 7.5; 325 TABLET ORAL at 22:50

## 2018-06-28 RX ADMIN — SODIUM CHLORIDE 1000 ML: 900 INJECTION, SOLUTION INTRAVENOUS at 20:55

## 2018-06-28 RX ADMIN — KETOROLAC TROMETHAMINE 30 MG: 30 INJECTION, SOLUTION INTRAMUSCULAR at 21:02

## 2018-06-28 RX ADMIN — ONDANSETRON 4 MG: 2 INJECTION INTRAMUSCULAR; INTRAVENOUS at 21:02

## 2018-06-28 NOTE — IP AVS SNAPSHOT
[address] [facility] [address] [facility] 
[phone] Patient: Douglas Duron MRN: [id number] :1975 A check brady indicates which time of day the medication should be taken. My Medications CHANGE how you take these medications Instructions Each Dose to Equal  
 Morning Noon Evening Bedtime  
 lamoTRIgine 25 mg tablet Commonly known as: LaMICtal  
What changed:   
- how much to take 
- additional instructions Your last dose was: Your next dose is: TAKE one tablet BY MOUTH TWICE DAILY LORazepam 0.5 mg tablet Commonly known as:  ATIVAN What changed:   
- how much to take 
- how to take this - when to take this 
- reasons to take this 
- additional instructions Your last dose was: Your next dose is: TAKE one tablet BY MOUTH THREE TIMES DAILY AS NEEDED  
     
   
   
   
  
 tiZANidine 2 mg tablet Commonly known as:  Rigoberto Rust What changed:  medication strength Your last dose was: Your next dose is: Take 2 Tabs by mouth three (3) times daily. Indications: Muscle Spasm 4 mg CONTINUE taking these medications Instructions Each Dose to Equal  
 Morning Noon Evening Bedtime  
 gabapentin 300 mg capsule Commonly known as:  NEURONTIN Your last dose was: Your next dose is: Take 1 Cap by mouth two (2) times a day. 300 mg HYDROcodone-acetaminophen 5-325 mg per tablet Commonly known as:  Kristen Joiner Your last dose was: Your next dose is: Take 1 Tab by mouth every four (4) hours as needed for Pain. Max Daily Amount: 6 Tabs. 1 Tab  
    
   
   
   
  
 lansoprazole 30 mg capsule Commonly known as:  PREVACID Your last dose was: Your next dose is: Take 1 Cap by mouth Daily (before breakfast). 30 mg PARoxetine 20 mg tablet Commonly known as:  PAXIL Your last dose was: Your next dose is: TAKE 1 TABLET BY MOUTH TWICE DAILY  
     
   
   
   
  
 rosuvastatin 20 mg tablet Commonly known as:  CRESTOR Your last dose was: Your next dose is: TAKE 1 TABLET BY MOUTH EVERY NIGHT  
     
   
   
   
  
  
STOP taking these medications   
 lisinopril-hydroCHLOROthiazide 20-25 mg per tablet Commonly known as:  Tiesha Faustin Where to Get Your Medications Information on where to get these meds will be given to you by the nurse or doctor. ! Ask your nurse or doctor about these medications tiZANidine 2 mg tablet

## 2018-06-28 NOTE — ED TRIAGE NOTES
, states had benign tumor removed from from behind right ear 2 weeks ago, c/o dizziness since pt states lost weight and her bp med has not been changed.

## 2018-06-28 NOTE — IP AVS SNAPSHOT
303 [facility] 
 
 
 300 [facility] [address] 
[phone] Patient: Delice Merlin MRN: [id number] :1975 About your hospitalization You were admitted on:  2018 You last received care in the:  [address] [place] [address] You were discharged on:  2018 Why you were hospitalized Your primary diagnosis was:  Hypotension Your diagnoses also included:  Olman (Acute Kidney Injury) (Formerly [facility]), Bipolar Disorder With Depression (Hcc), Mixed Hyperlipidemia, Essential Hypertension, Gastroesophageal Reflux Disease Without Esophagitis, Anxiety, Hypokalemia, Glomerulonephritis, Tobacco Abuse, Acute Kidney Failure (Hcc), Bacteremia Follow-up Information Follow up With Details Comments Contact Info Jarod Pleitez NP   [address] [address] [address] [place] 
[phone] Your Scheduled Appointments 2018 11:30 AM EDT Office Visit with Goldy Cabral  N [facility] ([address] [place] [address]) [address] [place] [address]  
[phone] Discharge Orders None A check brady indicates which time of day the medication should be taken. My Medications CHANGE how you take these medications Instructions Each Dose to Equal  
 Morning Noon Evening Bedtime  
 lamoTRIgine 25 mg tablet Commonly known as: LaMICtal  
What changed:   
- how much to take 
- additional instructions Your last dose was: Your next dose is: TAKE one tablet BY MOUTH TWICE DAILY LORazepam 0.5 mg tablet Commonly known as:  ATIVAN What changed:   
- how much to take 
- how to take this - when to take this 
- reasons to take this 
- additional instructions Your last dose was: Your next dose is: TAKE one tablet BY MOUTH THREE TIMES DAILY AS NEEDED  
     
   
   
   
  
 tiZANidine 2 mg tablet Commonly known as:  Ryann Gonzalez What changed:  medication strength Your last dose was: Your next dose is: Take 2 Tabs by mouth three (3) times daily. Indications: Muscle Spasm 4 mg CONTINUE taking these medications Instructions Each Dose to Equal  
 Morning Noon Evening Bedtime  
 gabapentin 300 mg capsule Commonly known as:  NEURONTIN Your last dose was: Your next dose is: Take 1 Cap by mouth two (2) times a day. 300 mg HYDROcodone-acetaminophen 5-325 mg per tablet Commonly known as:  Darwin Handler Your last dose was: Your next dose is: Take 1 Tab by mouth every four (4) hours as needed for Pain. Max Daily Amount: 6 Tabs. 1 Tab  
    
   
   
   
  
 lansoprazole 30 mg capsule Commonly known as:  PREVACID Your last dose was: Your next dose is: Take 1 Cap by mouth Daily (before breakfast). 30 mg PARoxetine 20 mg tablet Commonly known as:  PAXIL Your last dose was: Your next dose is: TAKE 1 TABLET BY MOUTH TWICE DAILY  
     
   
   
   
  
 rosuvastatin 20 mg tablet Commonly known as:  CRESTOR Your last dose was: Your next dose is: TAKE 1 TABLET BY MOUTH EVERY NIGHT  
     
   
   
   
  
  
STOP taking these medications   
 lisinopril-hydroCHLOROthiazide 20-25 mg per tablet Commonly known as:  Lencho Aguirre Where to Get Your Medications Information on where to get these meds will be given to you by the nurse or doctor. ! Ask your nurse or doctor about these medications tiZANidine 2 mg tablet Opioid Education  Prescription Opioids: What You Need to Know: 
 
Prescription opioids can be used to help relieve moderate-to-severe pain and are often prescribed following a surgery or injury, or for certain health conditions. These medications can be an important part of treatment but also come with serious risks. Opioids are strong pain medicines. Examples include hydrocodone, oxycodone, fentanyl, and morphine. Heroin is an example of an illegal opioid. It is important to work with your health care provider to make sure you are getting the safest, most effective care. WHAT ARE THE RISKS AND SIDE EFFECTS OF OPIOID USE? Prescription opioids carry serious risks of addiction and overdose, especially with prolonged use. An opioid overdose, often marked by slow breathing, can cause sudden death. The use of prescription opioids can have a number of side effects as well, even when taken as directed. · Tolerance-meaning you might need to take more of a medication for the same pain relief · Physical dependence-meaning you have symptoms of withdrawal when the medication is stopped. Withdrawal symptoms can include nausea, sweating, chills, diarrhea, stomach cramps, and muscle aches. Withdrawal can last up to several weeks, depending on which drug you took and how long you took it. · Increased sensitivity to pain · Constipation · Nausea, vomiting, and dry mouth · Sleepiness and dizziness · Confusion · Depression · Low levels of testosterone that can result in lower sex drive, energy, and strength · Itching and sweating RISKS ARE GREATER WITH:      
· History of drug misuse, substance use disorder, or overdose · Mental health conditions (such as depression or anxiety) · Sleep apnea · Older age (72 years or older) · Pregnancy Avoid alcohol while taking prescription opioids. Also, unless specifically advised by your health care provider, medications to avoid include: · Benzodiazepines (such as Xanax or Valium) · Muscle relaxants (such as Soma or Flexeril) · Hypnotics (such as Ambien or Lunesta) · Other prescription opioids KNOW YOUR OPTIONS Talk to your health care provider about ways to manage your pain that don't involve prescription opioids. Some of these options may actually work better and have fewer risks and side effects. Options may include: 
· Pain relievers such as acetaminophen, ibuprofen, and naproxen · Some medications that are also used for depression or seizures · Physical therapy and exercise · Counseling to help patients learn how to cope better with triggers of pain and stress. · Application of heat or cold compress · Massage therapy · Relaxation techniques Be Informed Make sure you know the name of your medication, how much and how often to take it, and its potential risks & side effects. IF YOU ARE PRESCRIBED OPIOIDS FOR PAIN: 
· Never take opioids in greater amounts or more often than prescribed. Remember the goal is not to be pain-free but to manage your pain at a tolerable level. · Follow up with your primary care provider to: · Work together to create a plan on how to manage your pain. · Talk about ways to help manage your pain that don't involve prescription opioids. · Talk about any and all concerns and side effects. · Help prevent misuse and abuse. · Never sell or share prescription opioids · Help prevent misuse and abuse. · Store prescription opioids in a secure place and out of reach of others (this may include visitors, children, friends, and family). · Safely dispose of unused/unwanted prescription opioids: Find your community drug take-back program or your pharmacy mail-back program, or flush them down the toilet, following guidance from the Food and Drug Administration (www.fda.gov/Drugs/ResourcesForYou). · Visit www.cdc.gov/drugoverdose to learn about the risks of opioid abuse and overdose. · If you believe you may be struggling with addiction, tell your health care provider and ask for guidance or call [facility] at 0-530-221-ZDJG. Discharge Instructions Low Blood Pressure: Care Instructions Your Care Instructions Blood pressure is a measurement of the force of the blood against the walls of the blood vessels during and after each beat of the heart. Low blood pressure is also called hypotension. It means that your blood pressure is much lower than normal. Some people, especially young, slim women, may have slightly low blood pressure without symptoms. But in many people, low blood pressure can cause symptoms such as feeling dizzy or lightheaded. When your blood pressure is too low, your heart, brain, and other organs do not get enough blood. Low blood pressure can be caused by many things, including heart problems and some medicines. Diabetes that is not under control can cause your blood pressure to drop. And so can a severe allergic reaction or infection. Another cause is dehydration, which is when your body loses too much fluid. Treatment for low blood pressure depends on the cause. Follow-up care is a key part of your treatment and safety. Be sure to make and go to all appointments, and call your doctor if you are having problems. It's also a good idea to know your test results and keep a list of the medicines you take. How can you care for yourself at home? · Drink plenty of fluids, enough so that your urine is light yellow or clear like water. If you have kidney, heart, or liver disease and have to limit fluids, talk with your doctor before you increase the amount of fluids you drink. · Be safe with medicines. Call your doctor if you think you are having a problem with your medicine. You will get more details on the specific medicines your doctor prescribes. · Stand up or get out of bed very slowly to allow your body to adjust. 
· Get plenty of rest. 
· Do not smoke. Smoking increases your risk of heart attack.  If you need help quitting, talk to your doctor about stop-smoking programs and medicines. These can increase your chances of quitting for good. · Limit alcohol to 2 drinks a day for men and 1 drink a day for women. Alcohol may interfere with your medicine. In addition, alcohol can make your low blood pressure worse by causing your body to lose water. When should you call for help? Call 911 anytime you think you may need emergency care. For example, call if: 
? · You passed out (lost consciousness). ?Call your doctor now or seek immediate medical care if: 
? · You are dizzy or lightheaded, or you feel like you may faint. ? Watch closely for changes in your health, and be sure to contact your doctor if you have any problems. Where can you learn more? Go to http://cori-zelda.info/. Enter C304 in the search box to learn more about \"Low Blood Pressure: Care Instructions. \" Current as of: September 21, 2016 Content Version: 11.4 © 5273-1073 [employer]. Care instructions adapted under license by [employer] (which disclaims liability or warranty for this information). If you have questions about a medical condition or this instruction, always ask your healthcare professional. [place] [address] any warranty or liability for your use of this information. DISCHARGE SUMMARY from Nurse PATIENT INSTRUCTIONS: 
 
 
F-face looks uneven A-arms unable to move or move unevenly S-speech slurred or non-existent T-time-call 911 as soon as signs and symptoms begin-DO NOT go Back to bed or wait to see if you get better-TIME IS BRAIN. Warning Signs of HEART ATTACK Call 911 if you have these symptoms: ? Chest discomfort. Most heart attacks involve discomfort in the center of the chest that lasts more than a few minutes, or that goes away and comes back. It can feel like uncomfortable pressure, squeezing, fullness, or pain. ? Discomfort in other areas of the upper body. Symptoms can include pain or discomfort in one or both arms, the back, neck, jaw, or stomach. ? Shortness of breath with or without chest discomfort. ? Other signs may include breaking out in a cold sweat, nausea, or lightheadedness. Don't wait more than five minutes to call [address]! Fast action can save your life. Calling 911 is almost always the fastest way to get lifesaving treatment. Emergency Medical Services staff can begin treatment when they arrive  up to an hour sooner than if someone gets to the hospital by car. The discharge information has been reviewed with the {PATIENT PARENT GUARDIAN:17336}. The {PATIENT PARENT GUARDIAN:53812} verbalized understanding. Discharge medications reviewed with the {Dishcarge meds reviewed RODK:93625} and appropriate educational materials and side effects teaching were provided. ___________________________________________________________________________________________________________________________________ Panono Announcement We are excited to announce that we are making your provider's discharge notes available to you in Panono. You will see these notes when they are completed and signed by the physician that discharged you from your recent hospital stay. If you have any questions or concerns about any information you see in Encisiont, please call the Health Information Department where you were seen or reach out to your Primary Care Provider for more information about your plan of care. Introducing [facility] [place] [facility]!    
 Cass Slade introduces Panono patient portal. Now you can access parts of your medical record, email your doctor's office, and request medication refills online. 1. In your internet browser, go to [email or web address] 2. Click on the First Time User? Click Here link in the Sign In box. You will see the New Member Sign Up page. 3. Enter your [employer] Access Code exactly as it appears below. You will not need to use this code after youve completed the sign-up process. If you do not sign up before the expiration date, you must request a new code. · [employer] Access Code: [email or web address] Expires: 9/26/2018  7:47 PM 
 
4. Enter the last four digits of your Social Security Number (xxxx) and Date of Birth (mm/dd/yyyy) as indicated and click Submit. You will be taken to the next sign-up page. 5. Create a [employer] ID. This will be your [employer] login ID and cannot be changed, so think of one that is secure and easy to remember. 6. Create a [employer] password. You can change your password at any time. 7. Enter your Password Reset Question and Answer. This can be used at a later time if you forget your password. 8. Enter your e-mail address. You will receive e-mail notification when new information is available in [address] 9. Click Sign Up. You can now view and download portions of your medical record. 10. Click the Download Summary menu link to download a portable copy of your medical information. If you have questions, please visit the Frequently Asked Questions section of the [employer] website. Remember, [employer] is NOT to be used for urgent needs. For medical emergencies, dial 911. Now available from your iPhone and Android! Introducing Partick Lorenzana As a [employer] patient, I wanted to make you aware of our electronic visit tool called Patrick Lorenzana. [employer] 24/7 allows you to connect within minutes with a medical provider 24 hours a day, seven days a week via a mobile device or tablet or logging into a secure website from your computer. You can access [employer] from anywhere in the [place]. A virtual visit might be right for you when you have a simple condition and feel like you just dont want to get out of bed, or cant get away from work for an appointment, when your regular [facility]z [facility] Ropes provider is not available (evenings, weekends or holidays), or when youre out of town and need minor care. Electronic visits cost only $49 and if the [facility] [employer] 24/7 provider determines a prescription is needed to treat your condition, one can be electronically transmitted to a nearby pharmacy*. Please take a moment to enroll today if you have not already done so. The enrollment process is free and takes just a few minutes. To enroll, please download the [employer] jake to your tablet or phone, or visit [email or web address] to enroll on your computer. And, as an [address] [place] [address] patient with a [employer] account, the results of your visits will be scanned into your electronic medical record and your primary care provider will be able to view the scanned results. We urge you to continue to see your regular Luz Elena Rop provider for your ongoing medical care. And while your primary care provider may not be the one available when you seek a Patrick Schererfin virtual visit, the peace of mind you get from getting a real diagnosis real time can be priceless. For more information on [employer], view our Frequently Asked Questions (FAQs) at [email or web address]. Sincerely, 
 
Keshawn Miller MD 
Chief Medical Officer [facility] Lexus Avila *:  certain medications cannot be prescribed via [employer] Unresulted Labs-Please follow up with your PCP about these lab tests Order Current Status CULTURE, BLOOD Preliminary result CULTURE, BLOOD Preliminary result Providers Seen During Your Hospitalization Provider Specialty Primary office phone Terrance Moon MD Emergency Medicine [phone] Parish Landers MD Internal Medicine [phone] Your Primary Care Physician (PCP) Primary Care Physician Office Phone Office Fax Rustam Ahn [phone] You are allergic to the following Allergen Reactions Erythromycin Hives Nausea and Vomiting Novocain (Procaine) Hives Pcn (Penicillins) Anaphylaxis Phenobarbital Unknown (comments) Sulfa (Sulfonamide Antibiotics) Hives Sulfur Hives Tetracycline Hives Recent Documentation Height Weight BMI OB Status Smoking Status 1.702 m 78.9 kg 27.25 kg/m2 Having regular periods Current Every Day Smoker Emergency Contacts Name Discharge Info Relation Home Work Mobile Jonathan Curiel DISCHARGE CAREGIVER [3] Spouse [3] [phone] [place] Leena CAREGIVER [3] Other Relative [6] [phone] Patient Belongings The following personal items are in your possession at time of discharge: 
  Dental Appliances: Uppers  Visual Aid: Glasses, With patient      Home Medications: None   Jewelry: Ring  Clothing: Shirt, Pants    Other Valuables: Cell Phone Please provide this summary of care documentation to your next provider. Signatures-by signing, you are acknowledging that this After Visit Summary has been reviewed with you and you have received a copy. Patient Signature:  ____________________________________________________________ Date:  ____________________________________________________________  
  
Kev Garcia Provider Signature:  ____________________________________________________________ Date:  ____________________________________________________________

## 2018-06-29 PROBLEM — N17.9 ACUTE KIDNEY FAILURE (HCC): Status: ACTIVE | Noted: 2018-06-29

## 2018-06-29 PROBLEM — Z72.0 TOBACCO ABUSE: Status: ACTIVE | Noted: 2018-06-29

## 2018-06-29 LAB
AMPHET UR QL SCN: NEGATIVE
ANION GAP SERPL CALC-SCNC: 7 MMOL/L (ref 7–16)
APPEARANCE UR: ABNORMAL
ATRIAL RATE: 73 BPM
BACTERIA URNS QL MICRO: 0 /HPF
BARBITURATES UR QL SCN: NEGATIVE
BENZODIAZ UR QL: NEGATIVE
BILIRUB UR QL: NEGATIVE
BUN SERPL-MCNC: 24 MG/DL (ref 6–23)
CALCIUM SERPL-MCNC: 7.7 MG/DL (ref 8.3–10.4)
CALCULATED P AXIS, ECG09: 71 DEGREES
CALCULATED R AXIS, ECG10: 75 DEGREES
CALCULATED T AXIS, ECG11: 77 DEGREES
CANNABINOIDS UR QL SCN: POSITIVE
CASTS URNS QL MICRO: ABNORMAL /LPF
CHLORIDE SERPL-SCNC: 108 MMOL/L (ref 98–107)
CK SERPL-CCNC: 45 U/L (ref 21–215)
CO2 SERPL-SCNC: 26 MMOL/L (ref 21–32)
COCAINE UR QL SCN: NEGATIVE
COLOR UR: ABNORMAL
CORTIS 1H P CHAL SERPL-MCNC: 31.2 UG/DL
CORTIS 30M P CHAL SERPL-MCNC: 28.9 UG/DL
CORTIS BS SERPL-MCNC: 14.2 UG/DL
CREAT SERPL-MCNC: 1.82 MG/DL (ref 0.6–1)
CREAT UR-MCNC: 50.57 MG/DL
DIAGNOSIS, 93000: NORMAL
EOSINOPHIL #/AREA URNS HPF: POSITIVE /[HPF]
EPI CELLS #/AREA URNS HPF: ABNORMAL /HPF
GLUCOSE SERPL-MCNC: 91 MG/DL (ref 65–100)
GLUCOSE UR STRIP.AUTO-MCNC: NEGATIVE MG/DL
HGB UR QL STRIP: ABNORMAL
KETONES UR QL STRIP.AUTO: NEGATIVE MG/DL
LEUKOCYTE ESTERASE UR QL STRIP.AUTO: ABNORMAL
MAGNESIUM SERPL-MCNC: 2.1 MG/DL (ref 1.8–2.4)
METHADONE UR QL: NEGATIVE
NITRITE UR QL STRIP.AUTO: NEGATIVE
OPIATES UR QL: NEGATIVE
P-R INTERVAL, ECG05: 156 MS
PCP UR QL: NEGATIVE
PH UR STRIP: 5.5 [PH] (ref 5–9)
PHOSPHATE SERPL-MCNC: 3.4 MG/DL (ref 2.5–4.5)
POTASSIUM SERPL-SCNC: 3.4 MMOL/L (ref 3.5–5.1)
PROCALCITONIN SERPL-MCNC: <0.1 NG/ML
PROT UR STRIP-MCNC: ABNORMAL MG/DL
PROT UR-MCNC: 56 MG/DL
PROT/CREAT UR-RTO: 1.1
Q-T INTERVAL, ECG07: 382 MS
QRS DURATION, ECG06: 92 MS
QTC CALCULATION (BEZET), ECG08: 420 MS
RBC #/AREA URNS HPF: >100 /HPF
SODIUM SERPL-SCNC: 141 MMOL/L (ref 136–145)
SP GR UR REFRACTOMETRY: 1.01 (ref 1–1.02)
UROBILINOGEN UR QL STRIP.AUTO: 0.2 EU/DL (ref 0.2–1)
VENTRICULAR RATE, ECG03: 73 BPM
WBC URNS QL MICRO: ABNORMAL /HPF

## 2018-06-29 PROCEDURE — 82533 TOTAL CORTISOL: CPT | Performed by: HOSPITALIST

## 2018-06-29 PROCEDURE — 80307 DRUG TEST PRSMV CHEM ANLYZR: CPT | Performed by: EMERGENCY MEDICINE

## 2018-06-29 PROCEDURE — 36415 COLL VENOUS BLD VENIPUNCTURE: CPT | Performed by: HOSPITALIST

## 2018-06-29 PROCEDURE — 99218 HC RM OBSERVATION: CPT

## 2018-06-29 PROCEDURE — 84100 ASSAY OF PHOSPHORUS: CPT | Performed by: HOSPITALIST

## 2018-06-29 PROCEDURE — 82043 UR ALBUMIN QUANTITATIVE: CPT | Performed by: HOSPITALIST

## 2018-06-29 PROCEDURE — 80048 BASIC METABOLIC PNL TOTAL CA: CPT | Performed by: HOSPITALIST

## 2018-06-29 PROCEDURE — 74011000250 HC RX REV CODE- 250: Performed by: HOSPITALIST

## 2018-06-29 PROCEDURE — 81001 URINALYSIS AUTO W/SCOPE: CPT | Performed by: HOSPITALIST

## 2018-06-29 PROCEDURE — 77030020263 HC SOL INJ SOD CL0.9% LFCR 1000ML

## 2018-06-29 PROCEDURE — 87205 SMEAR GRAM STAIN: CPT | Performed by: HOSPITALIST

## 2018-06-29 PROCEDURE — 83735 ASSAY OF MAGNESIUM: CPT | Performed by: HOSPITALIST

## 2018-06-29 PROCEDURE — 65270000029 HC RM PRIVATE

## 2018-06-29 PROCEDURE — 74011250636 HC RX REV CODE- 250/636: Performed by: HOSPITALIST

## 2018-06-29 PROCEDURE — 82550 ASSAY OF CK (CPK): CPT | Performed by: HOSPITALIST

## 2018-06-29 PROCEDURE — 84156 ASSAY OF PROTEIN URINE: CPT | Performed by: HOSPITALIST

## 2018-06-29 PROCEDURE — 74011250637 HC RX REV CODE- 250/637: Performed by: HOSPITALIST

## 2018-06-29 PROCEDURE — 74011250637 HC RX REV CODE- 250/637: Performed by: INTERNAL MEDICINE

## 2018-06-29 RX ORDER — SODIUM CHLORIDE 9 MG/ML
150 INJECTION, SOLUTION INTRAVENOUS CONTINUOUS
Status: DISCONTINUED | OUTPATIENT
Start: 2018-06-29 | End: 2018-07-01 | Stop reason: HOSPADM

## 2018-06-29 RX ORDER — IBUPROFEN 200 MG
1 TABLET ORAL DAILY
Status: DISCONTINUED | OUTPATIENT
Start: 2018-06-29 | End: 2018-07-01 | Stop reason: HOSPADM

## 2018-06-29 RX ORDER — IBUPROFEN 200 MG
1 TABLET ORAL DAILY
Status: DISCONTINUED | OUTPATIENT
Start: 2018-06-30 | End: 2018-06-29

## 2018-06-29 RX ORDER — ACETAMINOPHEN 325 MG/1
650 TABLET ORAL
Status: DISCONTINUED | OUTPATIENT
Start: 2018-06-29 | End: 2018-07-01 | Stop reason: HOSPADM

## 2018-06-29 RX ORDER — SODIUM CHLORIDE 0.9 % (FLUSH) 0.9 %
5-10 SYRINGE (ML) INJECTION EVERY 8 HOURS
Status: DISCONTINUED | OUTPATIENT
Start: 2018-06-29 | End: 2018-07-01 | Stop reason: HOSPADM

## 2018-06-29 RX ORDER — ENOXAPARIN SODIUM 100 MG/ML
40 INJECTION SUBCUTANEOUS EVERY 24 HOURS
Status: DISCONTINUED | OUTPATIENT
Start: 2018-06-29 | End: 2018-07-01 | Stop reason: HOSPADM

## 2018-06-29 RX ORDER — SODIUM CHLORIDE 0.9 % (FLUSH) 0.9 %
5-10 SYRINGE (ML) INJECTION AS NEEDED
Status: DISCONTINUED | OUTPATIENT
Start: 2018-06-29 | End: 2018-07-01 | Stop reason: HOSPADM

## 2018-06-29 RX ADMIN — ROSUVASTATIN CALCIUM 20 MG: 20 TABLET, FILM COATED ORAL at 01:53

## 2018-06-29 RX ADMIN — TIZANIDINE 4 MG: 2 TABLET ORAL at 21:45

## 2018-06-29 RX ADMIN — TIZANIDINE 4 MG: 2 TABLET ORAL at 08:30

## 2018-06-29 RX ADMIN — COSYNTROPIN 0.25 MG: 0.25 INJECTION, POWDER, LYOPHILIZED, FOR SOLUTION INTRAMUSCULAR; INTRAVENOUS at 07:57

## 2018-06-29 RX ADMIN — SODIUM CHLORIDE 150 ML/HR: 900 INJECTION, SOLUTION INTRAVENOUS at 17:12

## 2018-06-29 RX ADMIN — ACETAMINOPHEN 650 MG: 325 TABLET ORAL at 07:57

## 2018-06-29 RX ADMIN — LORAZEPAM 1 MG: 1 TABLET ORAL at 17:12

## 2018-06-29 RX ADMIN — SODIUM CHLORIDE 150 ML/HR: 900 INJECTION, SOLUTION INTRAVENOUS at 10:03

## 2018-06-29 RX ADMIN — GABAPENTIN 300 MG: 300 CAPSULE ORAL at 08:30

## 2018-06-29 RX ADMIN — PANTOPRAZOLE SODIUM 40 MG: 40 TABLET, DELAYED RELEASE ORAL at 02:01

## 2018-06-29 RX ADMIN — ENOXAPARIN SODIUM 40 MG: 40 INJECTION, SOLUTION INTRAVENOUS; SUBCUTANEOUS at 07:57

## 2018-06-29 RX ADMIN — PAROXETINE 20 MG: 20 TABLET, FILM COATED ORAL at 08:30

## 2018-06-29 RX ADMIN — LAMOTRIGINE 100 MG: 100 TABLET ORAL at 08:30

## 2018-06-29 RX ADMIN — PAROXETINE 20 MG: 20 TABLET, FILM COATED ORAL at 17:07

## 2018-06-29 RX ADMIN — SODIUM CHLORIDE 150 ML/HR: 900 INJECTION, SOLUTION INTRAVENOUS at 03:49

## 2018-06-29 RX ADMIN — GABAPENTIN 300 MG: 300 CAPSULE ORAL at 17:07

## 2018-06-29 RX ADMIN — ROSUVASTATIN CALCIUM 20 MG: 20 TABLET, FILM COATED ORAL at 21:45

## 2018-06-29 RX ADMIN — TIZANIDINE 4 MG: 2 TABLET ORAL at 17:07

## 2018-06-29 RX ADMIN — POTASSIUM CHLORIDE 40 MEQ: 20 TABLET, EXTENDED RELEASE ORAL at 01:53

## 2018-06-29 RX ADMIN — LAMOTRIGINE 100 MG: 100 TABLET ORAL at 17:08

## 2018-06-29 RX ADMIN — HYDROCODONE BITARTRATE AND ACETAMINOPHEN 1 TABLET: 5; 325 TABLET ORAL at 21:50

## 2018-06-29 NOTE — PROGRESS NOTES
MD aware of BP 79/43. No new orders placed at this time. Pt in bed; no acute signs of distress noted. Will continue to monitor.

## 2018-06-29 NOTE — PROGRESS NOTES
Problem: Interdisciplinary Rounds  Goal: Interdisciplinary Rounds  Interdisciplinary team rounds were held 6/29/2018 with the following team members:Care Management, Nursing, Nutrition, Pharmacy, Physical Therapy and Physician and the patient was asleep. Plan of care discussed. See clinical pathway and/or care plan for interventions and desired outcomes.

## 2018-06-29 NOTE — ED PROVIDER NOTES
HPI Comments: 49-year-old female has a history of hypertension. She has some renal problems including a remote history of glomerulonephritis. She's had some trouble with increasing creatinines in the last few months. Has an appointment with a nephrologist coming up. She had surgery for drainage of a scalp abscess approximately 2 weeks ago. Since that time she's had increasing dizziness described as lightheaded and orthostatic. She has nausea. No vomiting. Has some persistent headache for her scalp surgery was done. No chest pain or shortness of breath. No syncope. No fever or vomiting or diarrhea. No abnormal blood in the urine or bowels. Has been on lisinopril for years. She has lost approximately 150 pounds of weight. Patient is a 43 y.o. female presenting with dizziness. The history is provided by the patient. Dizziness   This is a new problem. The current episode started more than 1 week ago. The problem has been gradually worsening. There was no focality noted. Pertinent negatives include no focal weakness, no loss of sensation and no loss of balance. There has been no fever. Associated symptoms include shortness of breath, headaches (pain at surgery site) and nausea. Pertinent negatives include no chest pain, no vomiting, no confusion and no bladder incontinence. There were no medications administered prior to arrival. Associated medical issues do not include CVA.         Past Medical History:   Diagnosis Date    Bipolar disorder ([facility] [address]     Borderline personality disorder     Calculus of kidney     Chronic kidney disease     glomerular nephritis----  family m.d. has made a referral to a nephrologist-- but per pt hasnt made appt yet since infection on head came up    GERD (gastroesophageal reflux disease)     controlled with med    Headache     x 1 week    Hypertension     controlled with med    OCD (obsessive compulsive disorder)     Panic attacks     Psychiatric disorder bipolar, ptsd, anxiety, ocd,mmd    Scalp abscess        Past Surgical History:   Procedure Laterality Date    HX APPENDECTOMY      HX GYN          HX GYN      tubal ligation         Family History:   Problem Relation Age of Onset    Breast Cancer Mother 39    Cancer Mother      cervical     Breast Cancer Sister 44    Heart Disease Maternal Grandmother 24     aneurysm     Cancer Father      kidney     Heart Disease Father     Kidney Disease Father     No Known Problems Maternal Grandfather     Breast Cancer Paternal Grandmother 43    Heart Failure Paternal Grandfather     Other Sister      brain tumor        Social History     Social History    Marital status:      Spouse name: N/A    Number of children: N/A    Years of education: N/A     Occupational History    Not on file. Social History Main Topics    Smoking status: Current Every Day Smoker     Packs/day: 1.00     Years: 20.00    Smokeless tobacco: Never Used      Comment: trying to tapered off    Alcohol use No      Comment: quit 4 years ago    Drug use: No    Sexual activity: Not on file     Other Topics Concern    Not on file     Social History Narrative         ALLERGIES: Erythromycin; Novocain [procaine]; Pcn [penicillins]; Phenobarbital; Sulfa (sulfonamide antibiotics); Sulfur; and Tetracycline    Review of Systems   Constitutional: Negative for chills and fever. Eyes: Negative for visual disturbance. Respiratory: Positive for shortness of breath. Negative for cough. Cardiovascular: Negative for chest pain and palpitations. Gastrointestinal: Positive for nausea. Negative for abdominal pain, diarrhea and vomiting. Genitourinary: Negative for bladder incontinence, dysuria and flank pain. Musculoskeletal: Negative for back pain and neck pain. Skin: Negative for color change and rash. Neurological: Positive for dizziness and headaches (pain at surgery site).  Negative for focal weakness, syncope and loss of balance. Psychiatric/Behavioral: Negative for confusion. All other systems reviewed and are negative. Vitals:    06/28/18 1945   BP: (!) 88/60   Pulse: 85   Resp: 20   SpO2: 98%   Weight: 78.9 kg (174 lb)   Height: 5' 7\" (1.702 m)            Physical Exam   Constitutional: She is oriented to person, place, and time. She appears well-developed and well-nourished. No distress. HENT:   Head: Normocephalic and atraumatic. Right Ear: External ear normal.   Left Ear: External ear normal.   Mouth/Throat: Oropharynx is clear and moist. No oropharyngeal exudate. Eyes: Conjunctivae and EOM are normal. Pupils are equal, round, and reactive to light. Neck: Normal range of motion. Neck supple. Cardiovascular: Normal rate, regular rhythm and intact distal pulses. No murmur heard. Pulmonary/Chest: Breath sounds normal. No respiratory distress. Abdominal: No hernia. Neurological: She is alert and oriented to person, place, and time. Coordination and gait normal. GCS eye subscore is 4. GCS verbal subscore is 5. GCS motor subscore is 6. Nl speech   Skin: Skin is warm and dry. Psychiatric: She has a normal mood and affect. Her speech is normal.   Nursing note and vitals reviewed. MDM  Number of Diagnoses or Management Options  Diagnosis management comments: Suspect dehydration or worsening renal failure. Check screening blood work urinalysis and check EKG. IV fluids. Concerned that patient is taking an ACE inhibitor. Amount and/or Complexity of Data Reviewed  Clinical lab tests: ordered and reviewed  Independent visualization of images, tracings, or specimens: yes    Risk of Complications, Morbidity, and/or Mortality  Presenting problems: moderate  Diagnostic procedures: low  Management options: moderate  General comments: EKG shows normal sinus rhythm. No ST-T changes.     Patient Progress  Patient progress: improved        ED Course       Procedures      Results Include:    Recent Results (from the past 24 hour(s))   CBC WITH AUTOMATED DIFF    Collection Time: 06/28/18  7:53 PM   Result Value Ref Range    WBC 8.7 4.3 - 11.1 K/uL    RBC 4.25 4.05 - 5.25 M/uL    HGB 13.2 11.7 - 15.4 g/dL    HCT 38.4 35.8 - 46.3 %    MCV 90.4 79.6 - 97.8 FL    MCH 31.1 26.1 - 32.9 PG    MCHC 34.4 31.4 - 35.0 g/dL    RDW 14.0 11.9 - 14.6 %    PLATELET 418 893 - 642 K/uL    MPV 10.0 (L) 10.8 - 14.1 FL    DF AUTOMATED      NEUTROPHILS 61 43 - 78 %    LYMPHOCYTES 30 13 - 44 %    MONOCYTES 7 4.0 - 12.0 %    EOSINOPHILS 2 0.5 - 7.8 %    BASOPHILS 0 0.0 - 2.0 %    IMMATURE GRANULOCYTES 0 0.0 - 5.0 %    ABS. NEUTROPHILS 5.3 1.7 - 8.2 K/UL    ABS. LYMPHOCYTES 2.6 0.5 - 4.6 K/UL    ABS. MONOCYTES 0.6 0.1 - 1.3 K/UL    ABS. EOSINOPHILS 0.1 0.0 - 0.8 K/UL    ABS. BASOPHILS 0.0 0.0 - 0.2 K/UL    ABS. IMM. GRANS. 0.0 0.0 - 0.5 K/UL   METABOLIC PANEL, COMPREHENSIVE    Collection Time: 06/28/18  7:53 PM   Result Value Ref Range    Sodium 137 136 - 145 mmol/L    Potassium 3.2 (L) 3.5 - 5.1 mmol/L    Chloride 98 98 - 107 mmol/L    CO2 30 21 - 32 mmol/L    Anion gap 9 7 - 16 mmol/L    Glucose 88 65 - 100 mg/dL    BUN 24 (H) 6 - 23 MG/DL    Creatinine 2.02 (H) 0.6 - 1.0 MG/DL    GFR est AA 35 (L) >60 ml/min/1.73m2    GFR est non-AA 29 (L) >60 ml/min/1.73m2    Calcium 8.7 8.3 - 10.4 MG/DL    Bilirubin, total 0.4 0.2 - 1.1 MG/DL    ALT (SGPT) 23 12 - 65 U/L    AST (SGOT) 18 15 - 37 U/L    Alk.  phosphatase 68 50 - 136 U/L    Protein, total 7.0 6.3 - 8.2 g/dL    Albumin 3.6 3.5 - 5.0 g/dL    Globulin 3.4 2.3 - 3.5 g/dL    A-G Ratio 1.1 (L) 1.2 - 3.5     TROPONIN I    Collection Time: 06/28/18  7:53 PM   Result Value Ref Range    Troponin-I, Qt. <0.04 0.02 - 0.05 NG/ML   EKG, 12 LEAD, INITIAL    Collection Time: 06/28/18  8:01 PM   Result Value Ref Range    Ventricular Rate 73 BPM    Atrial Rate 73 BPM    P-R Interval 156 ms    QRS Duration 92 ms    Q-T Interval 382 ms    QTC Calculation (Bezet) 420 ms    Calculated P Axis 71 degrees    Calculated R Axis 75 degrees    Calculated T Axis 77 degrees    Diagnosis       Normal sinus rhythm  Normal ECG  When compared with ECG of 25-JUN-2011 12:53,  No significant change was found     POC LACTIC ACID    Collection Time: 06/28/18  9:57 PM   Result Value Ref Range    Lactic Acid (POC) 0.5 0.5 - 1.9 mmol/L            Xr Chest Port    Result Date: 6/28/2018  Portable chest xray  COMPARISON: January 6, 2012 INDICATION: Chest pain. FINDINGS: No focal consolidation, pleural effusion or pneumothorax. No pulmonary edema. Cardiac mediastinal contour is within normal limits. Surrounding bones are unremarkable. IMPRESSION: Negative chest x-ray. Plan is to obtain head CT to make sure there is no recurrent abscess. Patient received 3 L of fluid and it is in the high 98D with systolic pressure. No obvious signs of sepsis. No obvious signs of cardiogenic shock. Do not see any evidence for any blood loss. Concern for acute kidney injury exacerbated by ace inhibitors and perhaps medications she received Lasix recently during surgery.

## 2018-06-29 NOTE — ROUTINE PROCESS
TRANSFER - OUT REPORT:    Verbal report given to [facility], RN(name) on [employer]  being transferred to [facility](unit) for routine progression of care       Report consisted of patients Situation, Background, Assessment and   Recommendations(SBAR). Information from the following report(s) SBAR was reviewed with the receiving nurse. Lines:   Peripheral IV Left Antecubital (Active)   Site Assessment Clean, dry, & intact 6/28/2018  9:47 PM   Dressing Status Clean, dry, & intact 6/28/2018  9:47 PM        Opportunity for questions and clarification was provided.       Patient transported with:   JasonDB

## 2018-06-29 NOTE — PROGRESS NOTES
Admission assessment completed via doc flow sheet. Patient is alert and oriented x 4 but stated she has had periods of confusion and forgetfulness. Respirations even and unlabored on room air. Lung sounds CTA bilaterally. Heart sounds S1, S2 auscultated and regular. Abdomen soft and non tender. Bowel sounds active to all 4 quadrants. IV flushed without difficulty. Patient ambulates to bathroom as needed with assistance. Patient reported back pain and headache that she rated 9/10 at this time. Bed is locked and in low position. Family at bedside. Bed rails x 3. Bed alarm on. Patient is encouraged to call for assistance. Call light within reach.

## 2018-06-29 NOTE — PHYSICIAN ADVISORY
Letter of Determination: Upgrade from Observation to Inpatient Status    This patient was originally hospitalized as Outpatient Status with Observation Services on 6/28/2018 for hypotension. This patient now meets for Inpatient Admission based on medical necessity. The patient's stay was medically necessary based on presentation with hypotension not responsive to intravenous fluids, with persistent hypotension to 79/43 mmHg. It is our recommendation that this patient's hospitalization status should be upgraded from OBSERVATION to INPATIENT status.      The final decision regarding the patient's hospitalization status depends on the attending physician's judgement.     Lorriane Hodgkin MD, CAROLE,   Physician [place].

## 2018-06-29 NOTE — PROGRESS NOTES
Bedside and Verbal shift change report given to [address]  (oncoming nurse) by Eliseo Salcido  (offgoing nurse). Report included the following information SBAR, Kardex, Intake/Output, MAR and Recent Results.

## 2018-06-29 NOTE — PROGRESS NOTES
Problem: Falls - Risk of  Goal: *Absence of Falls  Document Conor Fall Risk and appropriate interventions in the flowsheet.    Outcome: Progressing Towards Goal  Fall Risk Interventions:  Mobility Interventions: Bed/chair exit alarm, Patient to call before getting OOB    Mentation Interventions: Adequate sleep, hydration, pain control, Bed/chair exit alarm, Door open when patient unattended, More frequent rounding    Medication Interventions: Assess postural VS orthostatic hypotension, Bed/chair exit alarm, Patient to call before getting OOB, Teach patient to arise slowly    Elimination Interventions: Bed/chair exit alarm, Call light in reach, Patient to call for help with toileting needs, Toileting schedule/hourly rounds

## 2018-06-29 NOTE — H&P
Hospitalist H&P/Consult Note     Admit Date:  2018  7:55 PM   Name:  Zara Valderrama   Age:  43 y.o.  :  1975   MRN:  [id number]   PCP:  Edil Cardona NP  Treatment Team: Attending Provider: Shine Duval MD    HPI:   42 y/o female with hx HTN, GERD, smoking, glomerulonephritis presents to the ED with dizziness upon standing or getting up quickly. No chest pain, fever or chills, nausea, vomiting. She recently had a scalp abscess drained and had been on clindamycin. She has some headache but no neurologic deficits. Workup IN ED shows her to be hypotensive with initial BP of 71/35. Even after 3 liters NS IV fluids remains hypotensive and orthostatic. She has JEANNIE with a Bun 24/Cr 2.02. Has been out in the heat some lately but she feels she has been drinking enough fluids and has had normal urine output. Of note, she had glomerulonephritis as a child and had to be on dialysis for a period of time. She has been scheduled to see a Nephrologist soon. Will admit as observation for further treatment and workup. 10 systems reviewed and negative except as noted in HPI. Past Medical History:   Diagnosis Date    Bipolar disorder ([facility] [address]     Borderline personality disorder     Calculus of kidney     Chronic kidney disease     glomerular nephritis----  family mdalton. has made a referral to a nephrologist-- but per pt hasnt made appt yet since infection on head came up    GERD (gastroesophageal reflux disease)     controlled with med    Headache     x 1 week    Hypertension     controlled with med    OCD (obsessive compulsive disorder)     Panic attacks     Psychiatric disorder     bipolar, ptsd, anxiety, ocd,mmd    Scalp abscess       Past Surgical History:   Procedure Laterality Date    HX APPENDECTOMY      HX GYN          HX GYN      tubal ligation      Prior to Admission Medications   Prescriptions Last Dose Informant Patient Reported? Taking?    HYDROcodone-acetaminophen (NORCO) 5-325 mg per tablet Not Taking at Unknown time  No No   Sig: Take 1 Tab by mouth every four (4) hours as needed for Pain. Max Daily Amount: 6 Tabs. LORazepam (ATIVAN) 0.5 mg tablet 6/28/2018 at Unknown time  No Yes   Sig: TAKE one tablet BY MOUTH THREE TIMES DAILY AS NEEDED   Patient taking differently: Take 1 mg by mouth three (3) times daily as needed. Per pt-- takes 3 times daily   PARoxetine (PAXIL) 20 mg tablet 6/28/2018 at Unknown time  No Yes   Sig: TAKE 1 TABLET BY MOUTH TWICE DAILY   gabapentin (NEURONTIN) 300 mg capsule Not Taking at Unknown time  No No   Sig: Take 1 Cap by mouth two (2) times a day. lamoTRIgine (LAMICTAL) 25 mg tablet 6/28/2018 at Unknown time  No Yes   Sig: TAKE one tablet BY MOUTH TWICE DAILY   Patient taking differently: 100 mg. TAKE one tablet BY MOUTH TWICE DAILY   lansoprazole (PREVACID) 30 mg capsule 6/28/2018 at Unknown time  No Yes   Sig: Take 1 Cap by mouth Daily (before breakfast). lisinopril-hydroCHLOROthiazide (PRINZIDE, ZESTORETIC) 20-25 mg per tablet 6/28/2018 at Unknown time  No Yes   Sig: TAKE 1 TABLET BY MOUTH DAILY   rosuvastatin (CRESTOR) 20 mg tablet 6/28/2018 at Unknown time  No Yes   Sig: TAKE 1 TABLET BY MOUTH EVERY NIGHT   tiZANidine (ZANAFLEX) 4 mg tablet 6/28/2018 at Unknown time  No Yes   Sig: Take 1 Tab by mouth three (3) times daily.       Facility-Administered Medications: None     Allergies   Allergen Reactions    Erythromycin Hives and Nausea and Vomiting    Novocain [Procaine] Hives    Pcn [Penicillins] Anaphylaxis    Phenobarbital Unknown (comments)    Sulfa (Sulfonamide Antibiotics) Hives    Sulfur Hives    Tetracycline Hives      Social History   Substance Use Topics    Smoking status: Current Every Day Smoker     Packs/day: 1.00     Years: 20.00    Smokeless tobacco: Never Used      Comment: trying to tapered off    Alcohol use No      Comment: quit 4 years ago      Family History   Problem Relation Age of Onset    Breast Cancer Mother 39    Cancer Mother      cervical     Breast Cancer Sister 44    Heart Disease Maternal Grandmother 24     aneurysm     Cancer Father      kidney     Heart Disease Father     Kidney Disease Father     No Known Problems Maternal Grandfather     Breast Cancer Paternal Grandmother 43    Heart Failure Paternal Grandfather     Other Sister      brain tumor       Immunization History   Administered Date(s) Administered    Influenza Vaccine (Quad) Mdck Pf 09/28/2017    Pneumococcal Polysaccharide (PPSV-23) 12/05/2017       Objective:     Patient Vitals for the past 24 hrs:   Temp Pulse Resp BP SpO2   06/29/18 0017 97.9 °F (36.6 °C) 79 16 97/63 99 %   06/28/18 2316 - 75 12 109/57 -   06/28/18 2207 - 68 16 93/55 -   06/28/18 2202 - 61 18 (!) 89/51 98 %   06/28/18 2146 - 70 19 (!) 71/35 98 %   06/28/18 2142 - 67 19 (!) 89/45 (!) 88 %   06/28/18 2100 - 67 17 96/55 95 %   06/28/18 2031 - 68 23 - 95 %   06/28/18 2030 - 68 - 91/52 94 %   06/28/18 2003 - 74 22 99/51 96 %   06/28/18 1945 - 85 20 (!) 88/60 98 %     Oxygen Therapy  O2 Sat (%): 99 % (06/29/18 0017)  Pulse via Oximetry: 65 beats per minute (06/28/18 2202)  O2 Device: Room air (06/28/18 1945)    Intake/Output Summary (Last 24 hours) at 06/29/18 0133  Last data filed at 06/29/18 0020   Gross per 24 hour   Intake                0 ml   Output              200 ml   Net             -200 ml       Physical Exam:  General:    Well nourished. Alert. appears older than stated age    Eyes:   Normal sclera. Extraocular movements intact. ENT:  Normocephalic, atraumatic. Dry mucous membranes  CV:   RRR. No murmur, rub, or gallop. Lungs:  CTAB. No wheezing, rhonchi, or rales. Abdomen: Soft, nontender, nondistended. Bowel sounds normal.   Extremities: Warm and dry. No cyanosis or edema. Neurologic: CN II-XII grossly intact. Sensation intact. Skin:     No rashes or jaundice. No wounds. Decreased turgor   Psych:  Normal mood and affect.     I reviewed the labs, imaging, EKGs, telemetry, and other studies done this admission. Data Review:   Recent Results (from the past 24 hour(s))   CBC WITH AUTOMATED DIFF    Collection Time: 06/28/18  7:53 PM   Result Value Ref Range    WBC 8.7 4.3 - 11.1 K/uL    RBC 4.25 4.05 - 5.25 M/uL    HGB 13.2 11.7 - 15.4 g/dL    HCT 38.4 35.8 - 46.3 %    MCV 90.4 79.6 - 97.8 FL    MCH 31.1 26.1 - 32.9 PG    MCHC 34.4 31.4 - 35.0 g/dL    RDW 14.0 11.9 - 14.6 %    PLATELET 390 454 - 350 K/uL    MPV 10.0 (L) 10.8 - 14.1 FL    DF AUTOMATED      NEUTROPHILS 61 43 - 78 %    LYMPHOCYTES 30 13 - 44 %    MONOCYTES 7 4.0 - 12.0 %    EOSINOPHILS 2 0.5 - 7.8 %    BASOPHILS 0 0.0 - 2.0 %    IMMATURE GRANULOCYTES 0 0.0 - 5.0 %    ABS. NEUTROPHILS 5.3 1.7 - 8.2 K/UL    ABS. LYMPHOCYTES 2.6 0.5 - 4.6 K/UL    ABS. MONOCYTES 0.6 0.1 - 1.3 K/UL    ABS. EOSINOPHILS 0.1 0.0 - 0.8 K/UL    ABS. BASOPHILS 0.0 0.0 - 0.2 K/UL    ABS. IMM. GRANS. 0.0 0.0 - 0.5 K/UL   METABOLIC PANEL, COMPREHENSIVE    Collection Time: 06/28/18  7:53 PM   Result Value Ref Range    Sodium 137 136 - 145 mmol/L    Potassium 3.2 (L) 3.5 - 5.1 mmol/L    Chloride 98 98 - 107 mmol/L    CO2 30 21 - 32 mmol/L    Anion gap 9 7 - 16 mmol/L    Glucose 88 65 - 100 mg/dL    BUN 24 (H) 6 - 23 MG/DL    Creatinine 2.02 (H) 0.6 - 1.0 MG/DL    GFR est AA 35 (L) >60 ml/min/1.73m2    GFR est non-AA 29 (L) >60 ml/min/1.73m2    Calcium 8.7 8.3 - 10.4 MG/DL    Bilirubin, total 0.4 0.2 - 1.1 MG/DL    ALT (SGPT) 23 12 - 65 U/L    AST (SGOT) 18 15 - 37 U/L    Alk.  phosphatase 68 50 - 136 U/L    Protein, total 7.0 6.3 - 8.2 g/dL    Albumin 3.6 3.5 - 5.0 g/dL    Globulin 3.4 2.3 - 3.5 g/dL    A-G Ratio 1.1 (L) 1.2 - 3.5     TROPONIN I    Collection Time: 06/28/18  7:53 PM   Result Value Ref Range    Troponin-I, Qt. <0.04 0.02 - 0.05 NG/ML   BNP    Collection Time: 06/28/18  7:53 PM   Result Value Ref Range    BNP 4 pg/mL   MAGNESIUM    Collection Time: 06/28/18  7:53 PM   Result Value Ref Range Magnesium 2.4 1.8 - 2.4 mg/dL   EKG, 12 LEAD, INITIAL    Collection Time: 06/28/18  8:01 PM   Result Value Ref Range    Ventricular Rate 73 BPM    Atrial Rate 73 BPM    P-R Interval 156 ms    QRS Duration 92 ms    Q-T Interval 382 ms    QTC Calculation (Bezet) 420 ms    Calculated P Axis 71 degrees    Calculated R Axis 75 degrees    Calculated T Axis 77 degrees    Diagnosis       Normal sinus rhythm  Normal ECG  When compared with ECG of 25-JUN-2011 12:53,  No significant change was found     POC LACTIC ACID    Collection Time: 06/28/18  9:57 PM   Result Value Ref Range    Lactic Acid (POC) 0.5 0.5 - 1.9 mmol/L   POC TROPONIN-I    Collection Time: 06/28/18 11:03 PM   Result Value Ref Range    Troponin-I (POC) 0 (L) 0.02 - 0.05 ng/ml   DRUG SCREEN, URINE    Collection Time: 06/29/18 12:32 AM   Result Value Ref Range    PCP(PHENCYCLIDINE) NEGATIVE       BENZODIAZEPINES NEGATIVE       COCAINE NEGATIVE       AMPHETAMINES NEGATIVE       METHADONE NEGATIVE       THC (TH-CANNABINOL) POSITIVE      OPIATES NEGATIVE       BARBITURATES NEGATIVE      URINALYSIS W/ RFLX MICROSCOPIC    Collection Time: 06/29/18 12:32 AM   Result Value Ref Range    Color RED      Appearance CLOUDY      Specific gravity 1.006 1.001 - 1.023      pH (UA) 5.5 5.0 - 9.0      Protein TRACE (A) NEG mg/dL    Glucose NEGATIVE  mg/dL    Ketone NEGATIVE  NEG mg/dL    Bilirubin NEGATIVE  NEG      Blood LARGE (A) NEG      Urobilinogen 0.2 0.2 - 1.0 EU/dL    Nitrites NEGATIVE  NEG      Leukocyte Esterase SMALL (A) NEG      WBC 10-20 0 /hpf    RBC >100 (H) 0 /hpf    Epithelial cells 0-3 0 /hpf    Bacteria 0 0 /hpf    Casts 0-3 0 /lpf       Imaging Studies:  CXR Results  (Last 48 hours)               06/28/18 2225  XR CHEST PORT Final result    Impression:  IMPRESSION:       Negative chest x-ray. Narrative:  Portable chest xray         COMPARISON: January 6, 2012       INDICATION: Chest pain.        FINDINGS:        No focal consolidation, pleural effusion or pneumothorax. No pulmonary edema. Cardiac mediastinal contour is within normal limits. Surrounding bones are   unremarkable. CT Results  (Last 48 hours)               06/28/18 2322  CT HEAD WO CONT Final result    Impression:  IMPRESSION:       No evidence of acute intracranial abnormality. Narrative:  Noncontrast CT of the brain. COMPARISON: June 25, 2011       INDICATION: Progressive headache with history of vomiting. Scalp surgery 2 weeks   ago. TECHNIQUE: Contiguous axial images were obtained from the skull base through the   vertex without IV contrast. Radiation dose reduction techniques were used for   this study:  Our CT scanners use one or all of the following: Automated exposure   control, adjustment of the mA and/or kVp according to patient's size, iterative   reconstruction. FINDINGS:       There is no acute intracranial hemorrhage or evidence for acute territorial   infarction. There is no mass effect, midline shift or hydrocephalus. There is no   extra-axial fluid collection. The cerebellum and brainstem are grossly   unremarkable. Included orbits and the globes are unremarkable. Partially visualized paranasal   sinuses and the mastoid air cells are aerated. Surrounding bones are intact.                  Assessment and Plan:     Hospital Problems as of 6/29/2018  Date Reviewed: 5/22/2018          Codes Class Noted - Resolved POA    * (Principal)Hypotension ICD-10-CM: I95.9  ICD-9-CM: 458.9  6/28/2018 - Present Yes        [facility]) [facility] ICD-10-CM: N17.9  ICD-9-CM: 584.9  6/28/2018 - Present Yes        Glomerulonephritis ICD-10-CM: N05.9  ICD-9-CM: 583.9  9/28/2017 - Present Yes        Tobacco abuse ICD-10-CM: Z72.0  ICD-9-CM: 305.1  6/29/2018 - Present Yes        Hypokalemia ICD-10-CM: E87.6  ICD-9-CM: 276.8  6/28/2018 - Present Yes        Gastroesophageal reflux disease without esophagitis ICD-10-CM: K21.9  ICD-9-CM: 530.81 5/16/2017 - Present Yes        Anxiety ICD-10-CM: F41.9  ICD-9-CM: 300.00  5/16/2017 - Present Yes        [facility] ICD-10-CM: F31.30  ICD-9-CM: 296.50  2/15/2017 - Present Yes        Mixed hyperlipidemia ICD-10-CM: E78.2  ICD-9-CM: 272.2  2/15/2017 - Present Yes        Essential hypertension ICD-10-CM: I10  ICD-9-CM: 401.9  2/15/2017 - Present Yes              PLAN:  · Admit as observation to medical bed  · Continue IV fluids at 150 cc hr  · Stop lisinopril/HCTZ  · Daily BMP. Check Mg, Phos, CK  · Send UA. Check for eosinophils, microalbumin  · Replace potassium  · Follow orthostatics  · Cosyntropin stim test in am to r/o adrenal insufficiency  · Continue other home meds.  Avoid NSAIDS  · Smoking cessation counseling  · lovenox for DVT prophylaxis      Estimated LOS:  Less than 24 hours    Signed:  Sumit Rivera MD

## 2018-06-29 NOTE — PROGRESS NOTES
TRANSFER - IN REPORT:    Verbal report received from [facility] (name) on Lissy Verma  being received from ED (unit) for routine progression of care      Report consisted of patients Situation, Background, Assessment and   Recommendations(SBAR). Information from the following report(s) SBAR, Kardex, ED Summary, Intake/Output, MAR, Accordion and Recent Results was reviewed with the receiving nurse. Opportunity for questions and clarification was provided. Assessment completed upon patients arrival to unit and care assumed.

## 2018-06-29 NOTE — PROGRESS NOTES
A.M assessment complete; pt in bed resting. Alert & oriented. Resp even & unlabored on room air; lungs clear; slightly diminished in lower lobes. HR regular. Abdomen soft with active bowel sounds. IVF infusing with no difficulty. Bed low & locked with alarm activated; call light in reach; instructed pt to call when she needs to get OOB for safety; will continue to monitor.

## 2018-06-29 NOTE — PROGRESS NOTES
06/29/18 0020   Dual Skin Pressure Injury Assessment   Dual Skin Pressure Injury Assessment WDL   Second Care Provider (Based on [address] [facility]) [facility] [place], [facility])

## 2018-06-29 NOTE — PROGRESS NOTES
Hospitalist Progress Note     Admit Date:  2018  7:55 PM   Name:  Geena Alarcon   Age:  43 y.o.  :  1975   MRN:  [id number]   PCP:  Vashti Urrutia NP  Treatment Team: Attending Provider: Saad Croft MD; Utilization Review: Gertrude Rose RN    Subjective:   From H&P: \" 42 y/o female with hx HTN, GERD, smoking, glomerulonephritis presents to the ED with dizziness upon standing or getting up quickly. No chest pain, fever or chills, nausea, vomiting. She recently had a scalp abscess drained and had been on clindamycin. She has some headache but no neurologic deficits. Workup IN ED shows her to be hypotensive with initial BP of 71/35. Even after 3 liters NS IV fluids remains hypotensive and orthostatic. She has JEANNIE with a Bun 24/Cr 2.02. Has been out in the heat some lately but she feels she has been drinking enough fluids and has had normal urine output. Of note, she had glomerulonephritis as a child and had to be on dialysis for a period of time. She has been scheduled to see a Nephrologist soon. Will admit as observation for further treatment and workup. \"    Pt seen and examined. She was lying in bed. Reports feeling better in general. Reports dizziness improved. No fever, chills, CP, abd pain, N/V or urinary symptoms.      Objective:   Patient Vitals for the past 24 hrs:   Temp Pulse Resp BP SpO2   18 1105 98 °F (36.7 °C) 64 16 95/58 98 %   18 0958 - (!) 57 - 111/82 -   18 0659 97.9 °F (36.6 °C) 91 16 (!) 79/43 98 %   18 0626 98.2 °F (36.8 °C) 84 - (!) 89/46 97 %   18 0348 97.6 °F (36.4 °C) 79 18 99/61 98 %   18 0017 97.9 °F (36.6 °C) 79 16 97/63 99 %   18 2316 - 75 12 109/57 -   187 - 68 16 93/55 -   18 18 (!) 89/51 98 %   18 19 (!) 71/35 98 %   18 19 (!) 89/45 (!) 88 %   18 17 96/55 95 %   18 23 - 95 %   18 - 91/52 94 %   18 2003 - 74 22 99/51 96 %   06/28/18 1945 - 85 20 (!) 88/60 98 %     Oxygen Therapy  O2 Sat (%): 98 % (06/29/18 1105)  Pulse via Oximetry: 65 beats per minute (06/28/18 2202)  O2 Device: Room air (06/28/18 1945)    Intake/Output Summary (Last 24 hours) at 06/29/18 1420  Last data filed at 06/29/18 1354   Gross per 24 hour   Intake                0 ml   Output             1775 ml   Net            -1775 ml       Physical Exam:  General:                    Well nourished. Alert. Eyes:                                   Normal sclera. Extraocular movements intact. ENT:                                    Normocephalic, atraumatic. Dry mucous membranes  CV:                                      RRR. No murmur, rub, or gallop. Lungs:                       CTAB. No wheezing, rhonchi, or rales. Abdomen:                  Soft, nontender, nondistended. Bowel sounds normal.   Extremities:               Warm and dry. No cyanosis or edema. Neurologic:                CN II-XII grossly intact. Sensation intact. Skin:                                    No rashes or jaundice. No wounds. Psych:                                 Normal mood and affect. Data Review:  I have reviewed all labs, meds, telemetry events, and studies from the last 24 hours. Recent Results (from the past 24 hour(s))   CBC WITH AUTOMATED DIFF    Collection Time: 06/28/18  7:53 PM   Result Value Ref Range    WBC 8.7 4.3 - 11.1 K/uL    RBC 4.25 4.05 - 5.25 M/uL    HGB 13.2 11.7 - 15.4 g/dL    HCT 38.4 35.8 - 46.3 %    MCV 90.4 79.6 - 97.8 FL    MCH 31.1 26.1 - 32.9 PG    MCHC 34.4 31.4 - 35.0 g/dL    RDW 14.0 11.9 - 14.6 %    PLATELET 949 370 - 296 K/uL    MPV 10.0 (L) 10.8 - 14.1 FL    DF AUTOMATED      NEUTROPHILS 61 43 - 78 %    LYMPHOCYTES 30 13 - 44 %    MONOCYTES 7 4.0 - 12.0 %    EOSINOPHILS 2 0.5 - 7.8 %    BASOPHILS 0 0.0 - 2.0 %    IMMATURE GRANULOCYTES 0 0.0 - 5.0 %    ABS. NEUTROPHILS 5.3 1.7 - 8.2 K/UL    ABS.  LYMPHOCYTES 2.6 0.5 - 4.6 K/UL    ABS. MONOCYTES 0.6 0.1 - 1.3 K/UL    ABS. EOSINOPHILS 0.1 0.0 - 0.8 K/UL    ABS. BASOPHILS 0.0 0.0 - 0.2 K/UL    ABS. IMM. GRANS. 0.0 0.0 - 0.5 K/UL   METABOLIC PANEL, COMPREHENSIVE    Collection Time: 06/28/18  7:53 PM   Result Value Ref Range    Sodium 137 136 - 145 mmol/L    Potassium 3.2 (L) 3.5 - 5.1 mmol/L    Chloride 98 98 - 107 mmol/L    CO2 30 21 - 32 mmol/L    Anion gap 9 7 - 16 mmol/L    Glucose 88 65 - 100 mg/dL    BUN 24 (H) 6 - 23 MG/DL    Creatinine 2.02 (H) 0.6 - 1.0 MG/DL    GFR est AA 35 (L) >60 ml/min/1.73m2    GFR est non-AA 29 (L) >60 ml/min/1.73m2    Calcium 8.7 8.3 - 10.4 MG/DL    Bilirubin, total 0.4 0.2 - 1.1 MG/DL    ALT (SGPT) 23 12 - 65 U/L    AST (SGOT) 18 15 - 37 U/L    Alk.  phosphatase 68 50 - 136 U/L    Protein, total 7.0 6.3 - 8.2 g/dL    Albumin 3.6 3.5 - 5.0 g/dL    Globulin 3.4 2.3 - 3.5 g/dL    A-G Ratio 1.1 (L) 1.2 - 3.5     TROPONIN I    Collection Time: 06/28/18  7:53 PM   Result Value Ref Range    Troponin-I, Qt. <0.04 0.02 - 0.05 NG/ML   BNP    Collection Time: 06/28/18  7:53 PM   Result Value Ref Range    BNP 4 pg/mL   PROCALCITONIN    Collection Time: 06/28/18  7:53 PM   Result Value Ref Range    Procalcitonin <0.1 ng/mL   MAGNESIUM    Collection Time: 06/28/18  7:53 PM   Result Value Ref Range    Magnesium 2.4 1.8 - 2.4 mg/dL   EKG, 12 LEAD, INITIAL    Collection Time: 06/28/18  8:01 PM   Result Value Ref Range    Ventricular Rate 73 BPM    Atrial Rate 73 BPM    P-R Interval 156 ms    QRS Duration 92 ms    Q-T Interval 382 ms    QTC Calculation (Bezet) 420 ms    Calculated P Axis 71 degrees    Calculated R Axis 75 degrees    Calculated T Axis 77 degrees    Diagnosis       Normal sinus rhythm  Normal ECG  When compared with ECG of 25-JUN-2011 12:53,  No significant change was found  Confirmed by CECE ALLEN (), Analisa Frazier ([id number]) on 6/29/2018 10:32:20 AM     POC LACTIC ACID    Collection Time: 06/28/18  9:57 PM   Result Value Ref Range    Lactic Acid (POC) 0.5 0.5 - 1.9 mmol/L   CULTURE, BLOOD    Collection Time: 06/28/18 10:57 PM   Result Value Ref Range    Special Requests: RIGHT  Antecubital        Culture result: NO GROWTH AFTER 8 HOURS     CULTURE, BLOOD    Collection Time: 06/28/18 10:57 PM   Result Value Ref Range    Special Requests: LEFT  Antecubital        Culture result: NO GROWTH AFTER 8 HOURS     POC TROPONIN-I    Collection Time: 06/28/18 11:03 PM   Result Value Ref Range    Troponin-I (POC) 0 (L) 0.02 - 0.05 ng/ml   DRUG SCREEN, URINE    Collection Time: 06/29/18 12:32 AM   Result Value Ref Range    PCP(PHENCYCLIDINE) NEGATIVE       BENZODIAZEPINES NEGATIVE       COCAINE NEGATIVE       AMPHETAMINES NEGATIVE       METHADONE NEGATIVE       THC (TH-CANNABINOL) POSITIVE      OPIATES NEGATIVE       BARBITURATES NEGATIVE      URINALYSIS W/ RFLX MICROSCOPIC    Collection Time: 06/29/18 12:32 AM   Result Value Ref Range    Color RED      Appearance CLOUDY      Specific gravity 1.006 1.001 - 1.023      pH (UA) 5.5 5.0 - 9.0      Protein TRACE (A) NEG mg/dL    Glucose NEGATIVE  mg/dL    Ketone NEGATIVE  NEG mg/dL    Bilirubin NEGATIVE  NEG      Blood LARGE (A) NEG      Urobilinogen 0.2 0.2 - 1.0 EU/dL    Nitrites NEGATIVE  NEG      Leukocyte Esterase SMALL (A) NEG      WBC 10-20 0 /hpf    RBC >100 (H) 0 /hpf    Epithelial cells 0-3 0 /hpf    Bacteria 0 0 /hpf    Casts 0-3 0 /lpf   EOSINOPHILS, URINE    Collection Time: 06/29/18 12:32 AM   Result Value Ref Range    Eosinophils,urine POSITIVE (A) NEG     PROTEIN/CREATININE RATIO, URINE    Collection Time: 06/29/18 12:32 AM   Result Value Ref Range    Protein, urine random 56 (H) <11.9 mg/dL    Creatinine, urine 50.57 mg/dL    Protein/Creat.  urine Ratio 1.1     CK    Collection Time: 06/29/18  5:56 AM   Result Value Ref Range    CK 45 21 - 215 U/L   PHOSPHORUS    Collection Time: 06/29/18  5:56 AM   Result Value Ref Range    Phosphorus 3.4 2.5 - 4.5 MG/DL   MAGNESIUM    Collection Time: 06/29/18  5:56 AM   Result Value Ref Range Magnesium 2.1 1.8 - 2.4 mg/dL   METABOLIC PANEL, BASIC    Collection Time: 06/29/18  5:56 AM   Result Value Ref Range    Sodium 141 136 - 145 mmol/L    Potassium 3.4 (L) 3.5 - 5.1 mmol/L    Chloride 108 (H) 98 - 107 mmol/L    CO2 26 21 - 32 mmol/L    Anion gap 7 7 - 16 mmol/L    Glucose 91 65 - 100 mg/dL    BUN 24 (H) 6 - 23 MG/DL    Creatinine 1.82 (H) 0.6 - 1.0 MG/DL    GFR est AA 39 (L) >60 ml/min/1.73m2    GFR est non-AA 32 (L) >60 ml/min/1.73m2    Calcium 7.7 (L) 8.3 - 10.4 MG/DL   CORTISOL, BASELINE    Collection Time: 06/29/18  5:56 AM   Result Value Ref Range    Cortisol, baseline 14.2 ug/dL        All Micro Results     Procedure Component Value Units Date/Time    CULTURE, BLOOD [[id number]] Collected:  06/28/18 2257    Order Status:  Completed Specimen:  Blood from Blood Updated:  06/29/18 0749     Special Requests: --        RIGHT  Antecubital       Culture result: NO GROWTH AFTER 8 HOURS       CULTURE, BLOOD [[id number]] Collected:  06/28/18 2257    Order Status:  Completed Specimen:  Whole Blood from Blood Updated:  06/29/18 0749     Special Requests: --        LEFT  Antecubital       Culture result: NO GROWTH AFTER 8 HOURS             Current Meds:  Current Facility-Administered Medications   Medication Dose Route Frequency    sodium chloride (NS) flush 5-10 mL  5-10 mL IntraVENous Q8H    sodium chloride (NS) flush 5-10 mL  5-10 mL IntraVENous PRN    0.9% sodium chloride infusion  150 mL/hr IntraVENous CONTINUOUS    enoxaparin (LOVENOX) injection 40 mg  40 mg SubCUTAneous Q24H    acetaminophen (TYLENOL) tablet 650 mg  650 mg Oral Q6H PRN    gabapentin (NEURONTIN) capsule 300 mg  300 mg Oral BID    HYDROcodone-acetaminophen (NORCO) 5-325 mg per tablet 1 Tab  1 Tab Oral Q4H PRN    pantoprazole (PROTONIX) tablet 40 mg  40 mg Oral ACB    lamoTRIgine (LaMICtal) tablet 100 mg  100 mg Oral BID    LORazepam (ATIVAN) tablet 1 mg  1 mg Oral TID PRN    rosuvastatin (CRESTOR) tablet 20 mg  20 mg Oral QHS    PARoxetine (PAXIL) tablet 20 mg  20 mg Oral BID    tiZANidine (ZANAFLEX) tablet 4 mg  4 mg Oral TID       Other Studies (last 24 hours):  Ct Head Wo Cont    Result Date: 6/29/2018  Noncontrast CT of the brain. COMPARISON: June 25, 2011 INDICATION: Progressive headache with history of vomiting. Scalp surgery 2 weeks ago. TECHNIQUE: Contiguous axial images were obtained from the skull base through the vertex without IV contrast. Radiation dose reduction techniques were used for this study:  Our CT scanners use one or all of the following: Automated exposure control, adjustment of the mA and/or kVp according to patient's size, iterative reconstruction. FINDINGS: There is no acute intracranial hemorrhage or evidence for acute territorial infarction. There is no mass effect, midline shift or hydrocephalus. There is no extra-axial fluid collection. The cerebellum and brainstem are grossly unremarkable. Included orbits and the globes are unremarkable. Partially visualized paranasal sinuses and the mastoid air cells are aerated. Surrounding bones are intact. IMPRESSION: No evidence of acute intracranial abnormality. Xr Chest Port    Result Date: 6/28/2018  Portable chest xray  COMPARISON: January 6, 2012 INDICATION: Chest pain. FINDINGS: No focal consolidation, pleural effusion or pneumothorax. No pulmonary edema. Cardiac mediastinal contour is within normal limits. Surrounding bones are unremarkable. IMPRESSION: Negative chest x-ray.          Assessment and Plan:     Hospital Problems as of 6/29/2018  Date Reviewed: 5/22/2018          Codes Class Noted - Resolved POA    Tobacco abuse ICD-10-CM: Z72.0  ICD-9-CM: 305.1  6/29/2018 - Present Yes        * (Principal)Hypotension ICD-10-CM: I95.9  ICD-9-CM: 458.9  6/28/2018 - Present Yes        JEANNIE (acute kidney injury) ([facility] [address] ICD-10-CM: N17.9  ICD-9-CM: 584.9  6/28/2018 - Present Yes        Hypokalemia ICD-10-CM: E87.6  ICD-9-CM: 276.8  6/28/2018 - Present Yes Glomerulonephritis ICD-10-CM: N05.9  ICD-9-CM: 583.9  9/28/2017 - Present Yes        Gastroesophageal reflux disease without esophagitis ICD-10-CM: K21.9  ICD-9-CM: 530.81  5/16/2017 - Present Yes        Anxiety ICD-10-CM: F41.9  ICD-9-CM: 300.00  5/16/2017 - Present Yes        Bipolar disorder [facility] depression ([facility] ICD-10-CM: F31.30  ICD-9-CM: 296.50  2/15/2017 - Present Yes        Mixed hyperlipidemia ICD-10-CM: E78.2  ICD-9-CM: 272.2  2/15/2017 - Present Yes        Essential hypertension ICD-10-CM: I10  ICD-9-CM: 401.9  2/15/2017 - Present Yes            PLAN:  · Continue IV fluids at 150 cc hr  · Stop lisinopril/HCTZ as likely the cause of her presentation. Will hold off and possibly place on low dose Norvasc on discharge if BP improves. · Daily BMP. · F/u UA. Check for eosinophils, microalbumin  · Replace potassium  · Orthostatics with low BP while at rest and sitting. Non-diagnostic. · Cosyntropin stim test wnl. TSH wnl. · Continue other home meds. Avoid NSAIDS  · Smoking cessation counseling  · lovenox for DVT prophylaxis    Disposition: Possibly home tomorrow if BP improves. Signed:   Kym Campos MD

## 2018-06-30 ENCOUNTER — APPOINTMENT (OUTPATIENT)
Dept: GENERAL RADIOLOGY | Age: 43
DRG: 312 | End: 2018-06-30
Attending: INTERNAL MEDICINE
Payer: COMMERCIAL

## 2018-06-30 PROBLEM — R78.81 BACTEREMIA: Status: ACTIVE | Noted: 2018-06-30

## 2018-06-30 LAB
ATRIAL RATE: 65 BPM
CALCULATED P AXIS, ECG09: 34 DEGREES
CALCULATED R AXIS, ECG10: 53 DEGREES
CALCULATED T AXIS, ECG11: 63 DEGREES
COLLECT DURATION TIME UR: 24 HR
CREAT 24H UR-MRATE: 1711 MG/24HR (ref 600–1800)
D DIMER PPP FEU-MCNC: 0.36 UG/ML(FEU)
DIAGNOSIS, 93000: NORMAL
MICROALBUMIN 24H UR-MRATE: 392 MG/24HR
MICROALBUMIN/CREAT 24H UR: 229 MG/G
P-R INTERVAL, ECG05: 144 MS
Q-T INTERVAL, ECG07: 402 MS
QRS DURATION, ECG06: 92 MS
QTC CALCULATION (BEZET), ECG08: 418 MS
SPECIMEN VOL ?TM UR: 2950 ML
TROPONIN I SERPL-MCNC: <0.04 NG/ML (ref 0.02–0.05)
VENTRICULAR RATE, ECG03: 65 BPM

## 2018-06-30 PROCEDURE — 65270000029 HC RM PRIVATE

## 2018-06-30 PROCEDURE — 74011250637 HC RX REV CODE- 250/637: Performed by: INTERNAL MEDICINE

## 2018-06-30 PROCEDURE — 74011000258 HC RX REV CODE- 258: Performed by: HOSPITALIST

## 2018-06-30 PROCEDURE — 84484 ASSAY OF TROPONIN QUANT: CPT | Performed by: HOSPITALIST

## 2018-06-30 PROCEDURE — 85379 FIBRIN DEGRADATION QUANT: CPT | Performed by: HOSPITALIST

## 2018-06-30 PROCEDURE — 77030020263 HC SOL INJ SOD CL0.9% LFCR 1000ML

## 2018-06-30 PROCEDURE — 74011000250 HC RX REV CODE- 250: Performed by: HOSPITALIST

## 2018-06-30 PROCEDURE — 36415 COLL VENOUS BLD VENIPUNCTURE: CPT | Performed by: HOSPITALIST

## 2018-06-30 PROCEDURE — 71045 X-RAY EXAM CHEST 1 VIEW: CPT

## 2018-06-30 PROCEDURE — 74011250637 HC RX REV CODE- 250/637: Performed by: HOSPITALIST

## 2018-06-30 PROCEDURE — 74011250636 HC RX REV CODE- 250/636: Performed by: HOSPITALIST

## 2018-06-30 PROCEDURE — 93005 ELECTROCARDIOGRAM TRACING: CPT | Performed by: INTERNAL MEDICINE

## 2018-06-30 PROCEDURE — 74011250636 HC RX REV CODE- 250/636: Performed by: INTERNAL MEDICINE

## 2018-06-30 RX ORDER — LORAZEPAM 2 MG/ML
0.5 INJECTION INTRAMUSCULAR
Status: DISCONTINUED | OUTPATIENT
Start: 2018-06-30 | End: 2018-07-01 | Stop reason: HOSPADM

## 2018-06-30 RX ORDER — MORPHINE SULFATE 2 MG/ML
2 INJECTION, SOLUTION INTRAMUSCULAR; INTRAVENOUS
Status: DISCONTINUED | OUTPATIENT
Start: 2018-06-30 | End: 2018-07-01 | Stop reason: HOSPADM

## 2018-06-30 RX ADMIN — LAMOTRIGINE 100 MG: 100 TABLET ORAL at 17:00

## 2018-06-30 RX ADMIN — GABAPENTIN 300 MG: 300 CAPSULE ORAL at 07:46

## 2018-06-30 RX ADMIN — CLINDAMYCIN PHOSPHATE 600 MG: 150 INJECTION, SOLUTION INTRAVENOUS at 13:49

## 2018-06-30 RX ADMIN — TIZANIDINE 4 MG: 2 TABLET ORAL at 09:09

## 2018-06-30 RX ADMIN — HYDROCODONE BITARTRATE AND ACETAMINOPHEN 1 TABLET: 5; 325 TABLET ORAL at 05:29

## 2018-06-30 RX ADMIN — CLINDAMYCIN PHOSPHATE 600 MG: 150 INJECTION, SOLUTION INTRAVENOUS at 22:35

## 2018-06-30 RX ADMIN — PANTOPRAZOLE SODIUM 40 MG: 40 TABLET, DELAYED RELEASE ORAL at 07:46

## 2018-06-30 RX ADMIN — PAROXETINE 20 MG: 20 TABLET, FILM COATED ORAL at 07:44

## 2018-06-30 RX ADMIN — ROSUVASTATIN CALCIUM 20 MG: 20 TABLET, FILM COATED ORAL at 22:39

## 2018-06-30 RX ADMIN — PAROXETINE 20 MG: 20 TABLET, FILM COATED ORAL at 17:00

## 2018-06-30 RX ADMIN — LORAZEPAM 1 MG: 1 TABLET ORAL at 07:53

## 2018-06-30 RX ADMIN — Medication 10 ML: at 22:35

## 2018-06-30 RX ADMIN — SODIUM CHLORIDE 150 ML/HR: 900 INJECTION, SOLUTION INTRAVENOUS at 12:46

## 2018-06-30 RX ADMIN — LAMOTRIGINE 100 MG: 100 TABLET ORAL at 07:45

## 2018-06-30 RX ADMIN — GABAPENTIN 300 MG: 300 CAPSULE ORAL at 17:00

## 2018-06-30 RX ADMIN — LORAZEPAM 0.5 MG: 2 INJECTION INTRAMUSCULAR; INTRAVENOUS at 15:09

## 2018-06-30 RX ADMIN — ACETAMINOPHEN 650 MG: 325 TABLET ORAL at 12:51

## 2018-06-30 RX ADMIN — MORPHINE SULFATE 2 MG: 2 INJECTION, SOLUTION INTRAMUSCULAR; INTRAVENOUS at 14:30

## 2018-06-30 RX ADMIN — TIZANIDINE 4 MG: 2 TABLET ORAL at 17:00

## 2018-06-30 RX ADMIN — SODIUM CHLORIDE 150 ML/HR: 900 INJECTION, SOLUTION INTRAVENOUS at 18:30

## 2018-06-30 RX ADMIN — SODIUM CHLORIDE 150 ML/HR: 900 INJECTION, SOLUTION INTRAVENOUS at 05:34

## 2018-06-30 RX ADMIN — CLINDAMYCIN PHOSPHATE 600 MG: 150 INJECTION, SOLUTION INTRAVENOUS at 05:30

## 2018-06-30 RX ADMIN — TIZANIDINE 4 MG: 2 TABLET ORAL at 22:39

## 2018-06-30 RX ADMIN — ENOXAPARIN SODIUM 40 MG: 40 INJECTION, SOLUTION INTRAVENOUS; SUBCUTANEOUS at 07:46

## 2018-06-30 RX ADMIN — MORPHINE SULFATE 2 MG: 2 INJECTION, SOLUTION INTRAMUSCULAR; INTRAVENOUS at 19:56

## 2018-06-30 NOTE — PROGRESS NOTES
Lab called. Blood cultures growing gram positive cocci. She had recent scalp abscess so likely the source. Avoiding vancomycin b/c of JEANNIE. No zyvox b/c interaction with her SSRI. Place on clindamycin until ID and sensitivities back. If MRSA and clinda resistance would need to consult ID for Cubicin.

## 2018-06-30 NOTE — PROGRESS NOTES
Pt in bed. Morphine 2 mg slow IVP given for chest pain 10/10. Cool cloth applied to pt's forehead. Family & pt given results of Cxray & EKG (both normal). Waiting for trop & d-dimer results.

## 2018-06-30 NOTE — PROGRESS NOTES
Problem: Falls - Risk of  Goal: *Absence of Falls  Document Conor Fall Risk and appropriate interventions in the flowsheet.    Outcome: Progressing Towards Goal  Fall Risk Interventions:  Mobility Interventions: Bed/chair exit alarm    Mentation Interventions: Adequate sleep, hydration, pain control, Bed/chair exit alarm    Medication Interventions: Patient to call before getting OOB    Elimination Interventions: Bed/chair exit alarm

## 2018-06-30 NOTE — PROGRESS NOTES
Bedside and Verbal shift change report given to Dario Nguyễn RN  (oncoming nurse) by Leila Youssef RN  (offgoing nurse). Report included the following information SBAR, Kardex, Intake/Output, MAR and Recent Results.

## 2018-06-30 NOTE — PROGRESS NOTES
Shift assessment complete. Patient resting in bed. Alert and & oriented x4. HR regular. Respirations symmetrical and unlabored on room air. Lung sounds clear. Abdomen soft and active bowel sounds. Bed in lowest position and wheels locked, call light within reach. Instructed patient to call when getting OOB. Will continue to monitor throughout shift.

## 2018-06-30 NOTE — PROGRESS NOTES
PM assessment complete. A/O x4. Respirations present. No signs of distress noted. HR regular. Abdomen soft, intact. PIV infusing w/o difficulty. 24 hour urine in progress. Denies needs or pain at this time. Safety measures in place. Will continue to monitor hourly.

## 2018-06-30 NOTE — PROGRESS NOTES
We were called to the pts room. Pt was c/o chest pain. VS 77, 113/76, O2 100%, resp 20. Pt states the center of her chest & left arm is in pain. MD called. Dr. Mercedes Mooney stated he would come to see the pt. Pt in tears & visibly scared.

## 2018-06-30 NOTE — PROGRESS NOTES
Hospitalist Progress Note     Admit Date:  2018  7:55 PM   Name:  Kleber Sanders   Age:  43 y.o.  :  1975   MRN:  [id number]   PCP:  Hilton Tyson NP  Treatment Team: Attending Provider: Tarsha Cuellar MD; Utilization Review: Luigi Payne RN    Subjective:   From H&P: \" 44 y/o female with hx HTN, GERD, smoking, glomerulonephritis presents to the ED with dizziness upon standing or getting up quickly. No chest pain, fever or chills, nausea, vomiting. She recently had a scalp abscess drained and had been on clindamycin. She has some headache but no neurologic deficits. Workup IN ED shows her to be hypotensive with initial BP of 71/35. Even after 3 liters NS IV fluids remains hypotensive and orthostatic. She has JEANNIE with a Bun 24/Cr 2.02. Has been out in the heat some lately but she feels she has been drinking enough fluids and has had normal urine output. Of note, she had glomerulonephritis as a child and had to be on dialysis for a period of time. She has been scheduled to see a Nephrologist soon. Will admit as observation for further treatment and workup. \"    Pt seen and examined. She was lying in bed. Reports feeling better in general. Reports dizziness improved. Anxious to go home. Updated her on the blood culture results. She started crying that she wants to go home with her children who are currently in the room at her bedside. No fever, chills, CP, abd pain, N/V or urinary symptoms.      Objective:     Patient Vitals for the past 24 hrs:   Temp Pulse Resp BP SpO2   18 1139 98.3 °F (36.8 °C) 71 18 (!) 88/50 98 %   18 0753 98.4 °F (36.9 °C) 66 18 114/73 100 %   18 0322 97.8 °F (36.6 °C) 64 16 109/65 96 %   18 0022 98 °F (36.7 °C) 88 18 114/60 94 %   18 1943 98.2 °F (36.8 °C) 64 18 92/55 99 %   18 1615 97.7 °F (36.5 °C) (!) 57 18 92/56 98 %     Oxygen Therapy  O2 Sat (%): 98 % (18 1139)  Pulse via Oximetry: 65 beats per minute (18 2202)  O2 Device: Room air (06/28/18 1945)    Intake/Output Summary (Last 24 hours) at 06/30/18 1330  Last data filed at 06/29/18 1943   Gross per 24 hour   Intake                0 ml   Output             1050 ml   Net            -1050 ml       Physical Exam:  General:                    Well nourished. Alert. Eyes:                                   Normal sclera. Extraocular movements intact. ENT:                                    Normocephalic, atraumatic. Dry mucous membranes  CV:                                      RRR. No murmur, rub, or gallop. Lungs:                       CTAB. No wheezing, rhonchi, or rales. Abdomen:                  Soft, nontender, nondistended. Bowel sounds normal.   Extremities:               Warm and dry. No cyanosis or edema. Neurologic:                CN II-XII grossly intact. Sensation intact. Skin:                                    No rashes or jaundice. No wounds. Psych:                                 Normal mood and affect. Data Review:  I have reviewed all labs, meds, telemetry events, and studies from the last 24 hours. No results found for this or any previous visit (from the past 24 hour(s)).      All Micro Results     Procedure Component Value Units Date/Time    CULTURE, BLOOD [[id number]] Collected:  06/28/18 2257    Order Status:  Completed Specimen:  Blood from Blood Updated:  06/30/18 0145     Special Requests: --        RIGHT  Antecubital       GRAM STAIN GRAM POSITIVE COCCI         ANAEROBIC BOTTLE POSITIVE                 RESULTS VERIFIED, PHONED TO AND READ BACK BY Shaw Bedolla AT 0143 06/30/2018 BY CTAYLOR     Culture result:         CULTURE IN [address] UPDATES TO FOLLOW    CULTURE, BLOOD [[id number]] Collected:  06/28/18 2257    Order Status:  Completed Specimen:  Whole Blood from Blood Updated:  06/29/18 0749     Special Requests: --        LEFT  Antecubital       Culture result: NO GROWTH AFTER 8 HOURS             Current Meds:  Current Facility-Administered Medications   Medication Dose Route Frequency    clindamycin phosphate (CLEOCIN) 600 mg in 0.9% sodium chloride (MBP/ADV) 100 mL ADV  600 mg IntraVENous Q8H    sodium chloride (NS) flush 5-10 mL  5-10 mL IntraVENous Q8H    sodium chloride (NS) flush 5-10 mL  5-10 mL IntraVENous PRN    0.9% sodium chloride infusion  150 mL/hr IntraVENous CONTINUOUS    enoxaparin (LOVENOX) injection 40 mg  40 mg SubCUTAneous Q24H    acetaminophen (TYLENOL) tablet 650 mg  650 mg Oral Q6H PRN    nicotine (NICODERM CQ) 21 mg/24 hr patch 1 Patch  1 Patch TransDERmal DAILY    gabapentin (NEURONTIN) capsule 300 mg  300 mg Oral BID    HYDROcodone-acetaminophen (NORCO) 5-325 mg per tablet 1 Tab  1 Tab Oral Q4H PRN    pantoprazole (PROTONIX) tablet 40 mg  40 mg Oral ACB    lamoTRIgine (LaMICtal) tablet 100 mg  100 mg Oral BID    LORazepam (ATIVAN) tablet 1 mg  1 mg Oral TID PRN    rosuvastatin (CRESTOR) tablet 20 mg  20 mg Oral QHS    PARoxetine (PAXIL) tablet 20 mg  20 mg Oral BID    tiZANidine (ZANAFLEX) tablet 4 mg  4 mg Oral TID       Other Studies (last 24 hours):  No results found.       Assessment and Plan:     Hospital Problems as of 6/30/2018  Date Reviewed: 5/22/2018          Codes Class Noted - Resolved POA    Bacteremia ICD-10-CM: R78.81  ICD-9-CM: 790.7  6/30/2018 - Present Unknown        Tobacco abuse ICD-10-CM: Z72.0  ICD-9-CM: 305.1  6/29/2018 - Present Yes        Acute kidney failure ([facility] ICD-10-CM: N17.9  ICD-9-CM: 584.9  6/29/2018 - Present Unknown        * (Principal)Hypotension ICD-10-CM: I95.9  ICD-9-CM: 458.9  6/28/2018 - Present Yes        JEANNIE (acute kidney injury) ([facility] ICD-10-CM: N17.9  ICD-9-CM: 584.9  6/28/2018 - Present Yes        Hypokalemia ICD-10-CM: E87.6  ICD-9-CM: 276.8  6/28/2018 - Present Yes        Glomerulonephritis ICD-10-CM: N05.9  ICD-9-CM: 583.9  9/28/2017 - Present Yes        Gastroesophageal reflux disease without esophagitis ICD-10-CM: K21.9  ICD-9-CM: 530.81  5/16/2017 - Present Yes        Anxiety ICD-10-CM: F41.9  ICD-9-CM: 300.00  5/16/2017 - Present Yes        B[facility] disorder [facility] depression ([facility] ICD-10-CM: F31.30  ICD-9-CM: 296.50  2/15/2017 - Present Yes        Mixed hyperlipidemia ICD-10-CM: E78.2  ICD-9-CM: 272.2  2/15/2017 - Present Yes        Essential hypertension ICD-10-CM: I10  ICD-9-CM: 401.9  2/15/2017 - Present Yes            PLAN:  · Continue IV fluids at 150 cc hr  · Stop lisinopril/HCTZ as likely the cause of her presentation. Will hold off and possibly place on low dose Norvasc on discharge if BP improves. Recent neck abscess 2 weeks ago ([facility]) which was drained and resolved. Pt was placed on Clindamycin. Will continue. · Bld Cx positive for G pos cocci (S&S pending). · Daily BMP. · Replace potassium  · Orthostatics with low BP while at rest and sitting. Non-diagnostic. · Cosyntropin stim test wnl. TSH wnl. · Continue other home meds. Avoid NSAIDS  · Smoking cessation counseling  · lovenox for DVT prophylaxis    Disposition: Possibly home tomorrow if BP improves. Signed:   Kamaljit Torres MD

## 2018-07-01 VITALS
TEMPERATURE: 98 F | HEART RATE: 60 BPM | SYSTOLIC BLOOD PRESSURE: 114 MMHG | BODY MASS INDEX: 27.31 KG/M2 | WEIGHT: 174 LBS | OXYGEN SATURATION: 100 % | RESPIRATION RATE: 18 BRPM | HEIGHT: 67 IN | DIASTOLIC BLOOD PRESSURE: 75 MMHG

## 2018-07-01 PROCEDURE — 74011000250 HC RX REV CODE- 250: Performed by: HOSPITALIST

## 2018-07-01 PROCEDURE — 74011250637 HC RX REV CODE- 250/637: Performed by: INTERNAL MEDICINE

## 2018-07-01 PROCEDURE — 74011000258 HC RX REV CODE- 258: Performed by: HOSPITALIST

## 2018-07-01 PROCEDURE — 77030011256 HC DRSG MEPILEX <16IN NO BORD MOLN -A

## 2018-07-01 PROCEDURE — 74011250636 HC RX REV CODE- 250/636: Performed by: INTERNAL MEDICINE

## 2018-07-01 PROCEDURE — 74011250636 HC RX REV CODE- 250/636: Performed by: HOSPITALIST

## 2018-07-01 PROCEDURE — 74011250637 HC RX REV CODE- 250/637: Performed by: HOSPITALIST

## 2018-07-01 PROCEDURE — 77030020263 HC SOL INJ SOD CL0.9% LFCR 1000ML

## 2018-07-01 RX ORDER — HYDROCODONE BITARTRATE AND ACETAMINOPHEN 5; 325 MG/1; MG/1
1 TABLET ORAL
Qty: 12 TAB | Refills: 0 | Status: SHIPPED | OUTPATIENT
Start: 2018-07-01 | End: 2018-07-25

## 2018-07-01 RX ORDER — TIZANIDINE 2 MG/1
4 TABLET ORAL 3 TIMES DAILY
Qty: 9 TAB | Refills: 0 | Status: SHIPPED | OUTPATIENT
Start: 2018-07-01 | End: 2018-07-06 | Stop reason: SDUPTHER

## 2018-07-01 RX ADMIN — CLINDAMYCIN PHOSPHATE 600 MG: 150 INJECTION, SOLUTION INTRAVENOUS at 06:32

## 2018-07-01 RX ADMIN — TIZANIDINE 4 MG: 2 TABLET ORAL at 08:03

## 2018-07-01 RX ADMIN — ENOXAPARIN SODIUM 40 MG: 40 INJECTION, SOLUTION INTRAVENOUS; SUBCUTANEOUS at 08:04

## 2018-07-01 RX ADMIN — GABAPENTIN 300 MG: 300 CAPSULE ORAL at 08:04

## 2018-07-01 RX ADMIN — LORAZEPAM 0.5 MG: 2 INJECTION INTRAMUSCULAR; INTRAVENOUS at 09:18

## 2018-07-01 RX ADMIN — MORPHINE SULFATE 2 MG: 2 INJECTION, SOLUTION INTRAMUSCULAR; INTRAVENOUS at 04:28

## 2018-07-01 RX ADMIN — PAROXETINE 20 MG: 20 TABLET, FILM COATED ORAL at 08:04

## 2018-07-01 RX ADMIN — PANTOPRAZOLE SODIUM 40 MG: 40 TABLET, DELAYED RELEASE ORAL at 08:04

## 2018-07-01 RX ADMIN — LAMOTRIGINE 100 MG: 100 TABLET ORAL at 08:04

## 2018-07-01 NOTE — PROGRESS NOTES
Resting quietly, awake with no c/o during bedside report given to Ame Ornelas RN. No distress noted.

## 2018-07-01 NOTE — PROGRESS NOTES
Pt c/o anxiety as pt was talking on the phone stated that she would like to have the IV medication instead to the pill because it is \"coming on quick\"  , administered ativan IV 0.5 mg per MD order, will continue to monitor.

## 2018-07-01 NOTE — PROGRESS NOTES
Shift assessment complete; pt in bed with family at bedside. Resp even & unlabored on room air; lungs clear. HR regular. Abd soft with active bowel sounds. No c/o pain or anxiety at this time. Call light in reach; bed low & locked; no needs voiced; will continue to monitor.

## 2018-07-01 NOTE — PROGRESS NOTES
Morphine 2 mg given IVP slowly for c/o pain rated \"9\". Agrees to call for asst to be out of bed after medicated, understanding rationale.

## 2018-07-01 NOTE — DISCHARGE INSTRUCTIONS
Low Blood Pressure: Care Instructions  Your Care Instructions    Blood pressure is a measurement of the force of the blood against the walls of the blood vessels during and after each beat of the heart. Low blood pressure is also called hypotension. It means that your blood pressure is much lower than normal. Some people, especially young, slim women, may have slightly low blood pressure without symptoms. But in many people, low blood pressure can cause symptoms such as feeling dizzy or lightheaded. When your blood pressure is too low, your heart, brain, and other organs do not get enough blood. Low blood pressure can be caused by many things, including heart problems and some medicines. Diabetes that is not under control can cause your blood pressure to drop. And so can a severe allergic reaction or infection. Another cause is dehydration, which is when your body loses too much fluid. Treatment for low blood pressure depends on the cause. Follow-up care is a key part of your treatment and safety. Be sure to make and go to all appointments, and call your doctor if you are having problems. It's also a good idea to know your test results and keep a list of the medicines you take. How can you care for yourself at home? · Drink plenty of fluids, enough so that your urine is light yellow or clear like water. If you have kidney, heart, or liver disease and have to limit fluids, talk with your doctor before you increase the amount of fluids you drink. · Be safe with medicines. Call your doctor if you think you are having a problem with your medicine. You will get more details on the specific medicines your doctor prescribes. · Stand up or get out of bed very slowly to allow your body to adjust.  · Get plenty of rest.  · Do not smoke. Smoking increases your risk of heart attack. If you need help quitting, talk to your doctor about stop-smoking programs and medicines.  These can increase your chances of quitting for good. · Limit alcohol to 2 drinks a day for men and 1 drink a day for women. Alcohol may interfere with your medicine. In addition, alcohol can make your low blood pressure worse by causing your body to lose water. When should you call for help? Call 911 anytime you think you may need emergency care. For example, call if:  ? · You passed out (lost consciousness). ?Call your doctor now or seek immediate medical care if:  ? · You are dizzy or lightheaded, or you feel like you may faint. ? Watch closely for changes in your health, and be sure to contact your doctor if you have any problems. Where can you learn more? Go to http://cori-zelda.info/. Enter C304 in the search box to learn more about \"Low Blood Pressure: Care Instructions. \"  Current as of: September 21, 2016  Content Version: 11.4  © 0847-4200 [employer]. Care instructions adapted under license by [employer] (which disclaims liability or warranty for this information). If you have questions about a medical condition or this instruction, always ask your healthcare professional. [place] [address] any warranty or liability for your use of this information. DISCHARGE SUMMARY from Nurse    PATIENT INSTRUCTIONS:    After general anesthesia or intravenous sedation, for 24 hours or while taking prescription Narcotics:  · Limit your activities  · Do not drive and operate hazardous machinery  · Do not make important personal or business decisions  · Do  not drink alcoholic beverages  · If you have not urinated within 8 hours after discharge, please contact your surgeon on call.     Report the following to your surgeon:  · Excessive pain, swelling, redness or odor of or around the surgical area  · Temperature over 100.5  · Nausea and vomiting lasting longer than 4 hours or if unable to take medications  · Any signs of decreased circulation or nerve impairment to extremity: change in color, persistent  numbness, tingling, coldness or increase pain  · Any questions    What to do at Home:  Recommended activity: Activity as tolerated, and as told by your doctor    If you experience any of the following symptoms listed below, please follow up with your doctor. *  Please give a list of your current medications to your Primary Care Provider. *  Please update this list whenever your medications are discontinued, doses are      changed, or new medications (including over-the-counter products) are added. *  Please carry medication information at all times in case of emergency situations. These are general instructions for a healthy lifestyle:    No smoking/ No tobacco products/ Avoid exposure to second hand smoke  Surgeon General's Warning:  Quitting smoking now greatly reduces serious risk to your health. Obesity, smoking, and sedentary lifestyle greatly increases your risk for illness    A healthy diet, regular physical exercise & weight monitoring are important for maintaining a healthy lifestyle    You may be retaining fluid if you have a history of heart failure or if you experience any of the following symptoms:  Weight gain of 3 pounds or more overnight or 5 pounds in a week, increased swelling in our hands or feet or shortness of breath while lying flat in bed. Please call your doctor as soon as you notice any of these symptoms; do not wait until your next office visit. Recognize signs and symptoms of STROKE:    F-face looks uneven    A-arms unable to move or move unevenly    S-speech slurred or non-existent    T-time-call 911 as soon as signs and symptoms begin-DO NOT go       Back to bed or wait to see if you get better-TIME IS BRAIN. Warning Signs of HEART ATTACK     Call 911 if you have these symptoms:   Chest discomfort. Most heart attacks involve discomfort in the center of the chest that lasts more than a few minutes, or that goes away and comes back.  It can feel like uncomfortable pressure, squeezing, fullness, or pain.  Discomfort in other areas of the upper body. Symptoms can include pain or discomfort in one or both arms, the back, neck, jaw, or stomach.  Shortness of breath with or without chest discomfort.  Other signs may include breaking out in a cold sweat, nausea, or lightheadedness. Don't wait more than five minutes to call 911 - MINUTES MATTER! Fast action can save your life. Calling 911 is almost always the fastest way to get lifesaving treatment. Emergency Medical Services staff can begin treatment when they arrive -- up to an hour sooner than if someone gets to the hospital by car. The discharge information has been reviewed with the patient. The patient verbalized understanding. Discharge medications reviewed with the patient and appropriate educational materials and side effects teaching were provided.   ___________________________________________________________________________________________________________________________________

## 2018-07-01 NOTE — PROGRESS NOTES
Pt discharged home, received new prescriptions and verbalized understand of discharge instructions. IV removed, cath tip intact. Pt aware to call when ready to leave.

## 2018-07-01 NOTE — DISCHARGE SUMMARY
Hospitalist Discharge Summary     Patient ID:  Meliza Morgan  [id number]  58 y.o.  1975  Admit date: 6/28/2018  7:55 PM  Discharge date and time: 7/1/2018  Attending: Pipo Santos MD  PCP:  Pippa Moon NP  Treatment Team: Attending Provider: Pipo Santos MD; Utilization Review: Cynthia Dave RN    Principal Diagnosis Hypotension   Principal Problem:    Hypotension (6/28/2018)    Active Problems:    Bipolar disorder with depression ([address] (2/15/2017)      Mixed hyperlipidemia (2/15/2017)      Essential hypertension (2/15/2017)      Gastroesophageal reflux disease without esophagitis (5/16/2017)      Anxiety (5/16/2017)      Glomerulonephritis (9/28/2017)      JEANNIE (acute kidney injury) ([facility] [address] (6/28/2018)      Hypokalemia (6/28/2018)      Tobacco abuse (6/29/2018)      Acute kidney failure ([facility] [address] (6/29/2018)      Bacteremia (6/30/2018)       Hospital Course:  Please refer to the admission H&P for details of presentation. In summary, the patient is 49-year-old with a history of hypertension, GERD, glomerulonephritis presenting to the emergency department with dizziness, lightheadedness and evidence of orthostatic hypotension. The patient was found have blood pressure of 71/35 with standing. She had acute kidney injury with a creatinine of 2.02. She had been out in the heat and may not have been drinking enough fluids as noted in the history. The patient was treated with aggressive IV fluids. She was bolused 3 L and then placed on 150 cc an hour of normal saline. Her blood pressure improved significantly. She had been on lisinopril HCTZ which has been discontinued. Her blood pressure does not require any treatment at this time. There was concern for sepsis and bacteremia secondary to a recent neck abscess that had been drained. The patient had already completed a course of clindamycin.   Blood culture results indicate coagulase-negative staph which is likely a skin contaminant. The second blood culture set remains negative. She does not require antibiotics at the time of discharge. She underwent a cosyntropin stimulation test which was normal.  Her creatinine has improved. She will require follow-up with nephrology as an outpatient. The patient is very keen to go home today. I had advised her that she should obtain a BMP for today to ensure appropriate response to treatment. No labs had been drawn yesterday or today. The patient is insisting that she needs to leave and will follow-up with her primary care physician in the next week. Strict precautions given to return to the hospital. She does not want more lab work here and feels \"great. \" Patient discharged home today in stable condition. Ambulating in the hallway with her family without difficulty. Significant Diagnostic Studies:       Labs: Results:       Chemistry Recent Labs      06/29/18 0556 06/28/18 1953   GLU  91  88   NA  141  137   K  3.4*  3.2*   CL  108*  98   CO2  26  30   BUN  24*  24*   CREA  1.82*  2.02*   CA  7.7*  8.7   AGAP  7  9   AP   --   68   TP   --   7.0   ALB   --   3.6   GLOB   --   3.4   AGRAT   --   1.1*      CBC w/Diff Recent Labs      06/28/18 1953   WBC  8.7   RBC  4.25   HGB  13.2   HCT  38.4   PLT  252   GRANS  61   LYMPH  30   EOS  2      Cardiac Enzymes Recent Labs      06/29/18 0556   CPK  45      Coagulation No results for input(s): PTP, INR, APTT in the last 72 hours. No lab exists for component: INREXT    Lipid Panel Lab Results   Component Value Date/Time    Cholesterol, total 128 12/05/2017 09:17 AM    HDL Cholesterol 30 (L) 12/05/2017 09:17 AM    LDL, calculated 60 12/05/2017 09:17 AM    VLDL, calculated 38 12/05/2017 09:17 AM    Triglyceride 188 (H) 12/05/2017 09:17 AM      BNP No results for input(s): BNPP in the last 72 hours.    Liver Enzymes Recent Labs      06/28/18 1953   TP  7.0   ALB  3.6   AP  68   SGOT  18      Thyroid Studies Lab Results Component Value Date/Time    TSH 0.740 04/05/2018 09:14 AM            Discharge Exam:  Visit Vitals    /75 (BP 1 Location: Right arm, BP Patient Position: At rest)    Pulse 60    Temp 98 °F (36.7 °C)    Resp 18    Ht 5' 7\" (1.702 m)    Wt 78.9 kg (174 lb)    LMP 06/26/2018    SpO2 100%    BMI 27.25 kg/m2     General appearance: alert, cooperative, no distress, appears stated age  Lungs: clear to auscultation bilaterally  Heart: regular rate and rhythm, S1, S2 normal, no murmur, click, rub or gallop  Abdomen: soft, non-tender. Bowel sounds normal. No masses,  no organomegaly  Extremities: no cyanosis or edema  Neurologic: Grossly normal    Disposition: home  Discharge Condition: stable  Patient Instructions:   Current Discharge Medication List      CONTINUE these medications which have CHANGED    Details   tiZANidine (ZANAFLEX) 2 mg tablet Take 2 Tabs by mouth three (3) times daily. Indications: Muscle Spasm  Qty: 9 Tab, Refills: 0         CONTINUE these medications which have NOT CHANGED    Details   lansoprazole (PREVACID) 30 mg capsule Take 1 Cap by mouth Daily (before breakfast). Qty: 90 Cap, Refills: 4    Associated Diagnoses: Gastroesophageal reflux disease without esophagitis      rosuvastatin (CRESTOR) 20 mg tablet TAKE 1 TABLET BY MOUTH EVERY NIGHT  Qty: 90 Tab, Refills: 4    Comments: **Patient requests 90 days supply**  Associated Diagnoses: Mixed hyperlipidemia      PARoxetine (PAXIL) 20 mg tablet TAKE 1 TABLET BY MOUTH TWICE DAILY  Qty: 180 Tab, Refills: 0    Associated Diagnoses: Bipolar disorder with depression (HCC)      LORazepam (ATIVAN) 0.5 mg tablet TAKE one tablet BY MOUTH THREE TIMES DAILY AS NEEDED  Qty: 75 Tab, Refills: 0    Associated Diagnoses: Bipolar disorder with depression ([address]  Anxiety      lamoTRIgine (LAMICTAL) 25 mg tablet TAKE one tablet BY MOUTH TWICE DAILY  Qty: 180 Tab, Refills: 1    Associated Diagnoses: Bipolar disorder with depression ([address] HYDROcodone-acetaminophen (NORCO) 5-325 mg per tablet Take 1 Tab by mouth every four (4) hours as needed for Pain. Max Daily Amount: 6 Tabs. Qty: 20 Tab, Refills: 0    Associated Diagnoses: Scalp abscess      gabapentin (NEURONTIN) 300 mg capsule Take 1 Cap by mouth two (2) times a day.   Qty: 60 Cap, Refills: 2    Associated Diagnoses: Numbness and tingling; Pain         STOP taking these medications       lisinopril-hydroCHLOROthiazide (PRINZIDE, ZESTORETIC) 20-25 mg per tablet Comments:   Reason for Stopping:               Activity: Activity as tolerated  Diet: Regular Diet  Wound Care: Keep wound clean and dry    Follow-up  ·   PCP 1 week      Time spent to discharge patient 35 minutes  Signed:  Jim Iraheta MD  7/1/2018  9:32 AM

## 2018-07-01 NOTE — PROGRESS NOTES
Resting quietly, awake, resp even, unlab, skin warm, dry. AP 60, regular, lungs sounds clear. Assessment completed. C/o BLE pain, described as aching.

## 2018-07-01 NOTE — PROGRESS NOTES
Continues to void without difficulty. Morphine 2 mg given IVP slowly for c/o returned bilateral LE pain, rated \"9\", described as aching.

## 2018-07-02 LAB
BACTERIA SPEC CULT: ABNORMAL
BACTERIA SPEC CULT: ABNORMAL
GRAM STN SPEC: ABNORMAL
SERVICE CMNT-IMP: ABNORMAL

## 2018-07-03 LAB
BACTERIA SPEC CULT: NORMAL
SERVICE CMNT-IMP: NORMAL

## 2018-08-20 ENCOUNTER — HOSPITAL ENCOUNTER (OUTPATIENT)
Dept: GENERAL RADIOLOGY | Age: 43
Discharge: HOME | End: 2018-08-20
Attending: NURSE PRACTITIONER
Payer: COMMERCIAL

## 2018-08-20 DIAGNOSIS — M25.552 LEFT HIP PAIN: ICD-10-CM

## 2018-08-20 PROCEDURE — 73502 X-RAY EXAM HIP UNI 2-3 VIEWS: CPT

## 2018-11-03 ENCOUNTER — APPOINTMENT (OUTPATIENT)
Dept: GENERAL RADIOLOGY | Age: 43
End: 2018-11-03
Attending: NURSE PRACTITIONER
Payer: COMMERCIAL

## 2018-11-03 ENCOUNTER — HOSPITAL ENCOUNTER (EMERGENCY)
Age: 43
Discharge: HOME | End: 2018-11-03
Attending: EMERGENCY MEDICINE
Payer: COMMERCIAL

## 2018-11-03 VITALS
BODY MASS INDEX: 26.68 KG/M2 | HEIGHT: 67 IN | OXYGEN SATURATION: 98 % | TEMPERATURE: 99.2 F | WEIGHT: 170 LBS | HEART RATE: 103 BPM | RESPIRATION RATE: 18 BRPM | SYSTOLIC BLOOD PRESSURE: 127 MMHG | DIASTOLIC BLOOD PRESSURE: 76 MMHG

## 2018-11-03 DIAGNOSIS — S70.02XA CONTUSION OF LEFT HIP, INITIAL ENCOUNTER: Primary | ICD-10-CM

## 2018-11-03 PROCEDURE — 73562 X-RAY EXAM OF KNEE 3: CPT

## 2018-11-03 PROCEDURE — 74011250637 HC RX REV CODE- 250/637: Performed by: NURSE PRACTITIONER

## 2018-11-03 PROCEDURE — 99283 EMERGENCY DEPT VISIT LOW MDM: CPT | Performed by: NURSE PRACTITIONER

## 2018-11-03 PROCEDURE — 73502 X-RAY EXAM HIP UNI 2-3 VIEWS: CPT

## 2018-11-03 RX ORDER — DIFLUNISAL 500 MG/1
500 TABLET, FILM COATED ORAL 2 TIMES DAILY
Qty: 20 TAB | Refills: 0 | Status: SHIPPED | OUTPATIENT
Start: 2018-11-03 | End: 2019-09-15

## 2018-11-03 RX ORDER — HYDROCODONE BITARTRATE AND ACETAMINOPHEN 5; 325 MG/1; MG/1
1 TABLET ORAL ONCE
Status: COMPLETED | OUTPATIENT
Start: 2018-11-03 | End: 2018-11-03

## 2018-11-03 RX ADMIN — HYDROCODONE BITARTRATE AND ACETAMINOPHEN 1 TABLET: 5; 325 TABLET ORAL at 14:36

## 2018-11-03 NOTE — ED PROVIDER NOTES
This patient presents the ED with a complaint of left anterior hip pain onset after a fall down a single step yesterday. She states that she was walking up her stairs in her home, when she slipped on the top stair, and fell landing on the stairs, but denies sliding down any further. Any other injury, including injury to her shoulder, or ankle. Denies hitting her head or syncope. The history is provided by the patient. No  was used. Hip Injury This is a new problem. The current episode started yesterday. The problem occurs constantly. The problem has not changed since onset. The pain is present in the left hip and left knee. The pain is at a severity of 6/10. The pain is moderate. Associated symptoms include stiffness. Pertinent negatives include no numbness and no back pain. The symptoms are aggravated by activity, palpation and movement. She has tried rest for the symptoms. The treatment provided no relief. There has been a history of trauma. Past Medical History:  
Diagnosis Date  Bipolar disorder ([facility] [address]  Borderline personality disorder ([facility] [address]  Calculus of kidney  Chronic kidney disease   
 glomerular nephritis----  family m.edilberto. has made a referral to a nephrologist-- but per pt hasnt made appt yet since infection on head came up  GERD (gastroesophageal reflux disease)   
 controlled with med  Headache   
 x 1 week  Hypertension   
 controlled with med  OCD (obsessive compulsive disorder)  Panic attacks  Psychiatric disorder   
 bipolar, ptsd, anxiety, ocd,mmd  
 Scalp abscess Past Surgical History:  
Procedure Laterality Date  HX APPENDECTOMY  HX GYN    
   HX GYN    
 tubal ligation Family History:  
Problem Relation Age of Onset  Breast Cancer Mother 39  Cancer Mother   
     cervical   
 Breast Cancer Sister 44  
 Heart Disease Maternal Grandmother 24  
     aneurysm  Cancer Father kidney  Heart Disease Father  Kidney Disease Father  No Known Problems Maternal Grandfather  Breast Cancer Paternal Grandmother 43  
 Heart Failure Paternal Grandfather  Other Sister   
     brain tumor Social History Socioeconomic History  Marital status:  Spouse name: Not on file  Number of children: Not on file  Years of education: Not on file  Highest education level: Not on file Social Needs  Financial resource strain: Not on file  Food insecurity - worry: Not on file  Food insecurity - inability: Not on file  Transportation needs - medical: Not on file  Transportation needs - non-medical: Not on file Occupational History  Not on file Tobacco Use  Smoking status: Current Every Day Smoker Packs/day: 1.00 Years: 20.00 Pack years: 20.00  Smokeless tobacco: Never Used  Tobacco comment: trying to tapered off Substance and Sexual Activity  Alcohol use: No  
  Alcohol/week: 0.0 oz  
  Comment: quit 4 years ago  Drug use: No  
 Sexual activity: Not on file Other Topics Concern  Not on file Social History Narrative  Not on file ALLERGIES: Erythromycin; Novocain [procaine]; Pcn [penicillins]; Phenobarbital; Sulfa (sulfonamide antibiotics); Sulfur; and Tetracycline Review of Systems Constitutional: Negative for chills and fever. Respiratory: Negative for chest tightness, shortness of breath and wheezing. Cardiovascular: Negative for chest pain and palpitations. Gastrointestinal: Negative for nausea and vomiting. Genitourinary: Negative for difficulty urinating, frequency, hematuria and urgency. Musculoskeletal: Positive for arthralgias and stiffness. Negative for back pain and joint swelling. Skin: Negative for rash and wound. Neurological: Negative for dizziness, syncope and numbness. Hematological: Negative for adenopathy. Does not bruise/bleed easily. Vitals: 11/03/18 1350 BP: 114/82 Pulse: (!) 103 Resp: 18 Temp: 99.2 °F (37.3 °C) SpO2: 98% Weight: 77.1 kg (170 lb) Height: 5' 7\" (1.702 m) Physical Exam  
Constitutional: She is oriented to person, place, and time. She appears well-developed and well-nourished. She appears distressed (appears uncomfortable). HENT:  
Head: Normocephalic and atraumatic. Neck: Normal range of motion. Neck supple. No tracheal deviation present. Cardiovascular: Normal heart sounds and intact distal pulses. Pulmonary/Chest: Effort normal. No respiratory distress. Abdominal: Soft. She exhibits no distension and no mass. There is no tenderness. There is no guarding. Musculoskeletal: She exhibits tenderness. She exhibits no edema. Exquisite tenderness to palpation along the anterior hip, with limited range of motion the hip. No obvious shortening or external rotation noted, though the patient is turned on her right side, which makes evaluation somewhat more challenging. On palpation of the knee there is no tenderness to palpation, though there is some pain on range of motion with flexion. Neurological: She is alert and oriented to person, place, and time. Skin: Skin is warm and dry. Capillary refill takes less than 2 seconds. No erythema. MDM Number of Diagnoses or Management Options Diagnosis management comments: Given the tenderness, I will evaluate the hip with x-ray at this time. 11/3/2018 Corina Almanza NP 3:06 PM 
 
No fracture deformity seen on x-ray of the hip or knee. Patient feeling better at this time. Discussed conservative treatment, including stretching exercises, and ice therapy. The patient and her family present at the bedside both voiced full understanding and compliance with the plan. Amount and/or Complexity of Data Reviewed Tests in the radiology section of CPT®: ordered and reviewed Risk of Complications, Morbidity, and/or Mortality Presenting problems: minimal 
Diagnostic procedures: minimal 
Management options: low Patient Progress Patient progress: stable Procedures Portions of this chart were dictated using a voice-to-text program, BULMARO Roberts 21. While care was taken to eliminate any dictation inaccuracies, please excuse any dictation errors.

## 2018-11-03 NOTE — ED NOTES

## 2018-11-03 NOTE — DISCHARGE INSTRUCTIONS
Rest, use ice compresses, and increased irritability in the leg has tolerated to help decrease pain. Use the crutches provided to help allow for mobility and movement without bearing weight; you may stop using the crutches when you're able to bear weight without pain. Take the Dolobid as prescribed to help decrease pain and inflammation. Recommend using the mobility exercises as discussed to help increase range of motion in the leg as tolerated. Follow up with her primary doctor as discussed for further evaluation and treatment; use the numbers as needed in your paperwork as provided. Return to the ED if Needed worsening symptoms, including difficulty walking, difficulty moving your leg, difficulty urinating, or abdominal pain.

## 2018-11-03 NOTE — ED TRIAGE NOTES
Pt states she slipped down stairs yesterday and landed on left hip. States pain with coughing/walking/movement. Pain radiates from hip to knee.

## 2019-02-15 NOTE — ED NOTES
I have reviewed discharge instructions with the patient. The patient verbalized understanding. Opportunity provided for questions and clarification. Pt ambulatory to exit with steady gait.
No

## 2019-05-06 ENCOUNTER — HOSPITAL ENCOUNTER (EMERGENCY)
Age: 44
Discharge: HOME | End: 2019-05-06
Payer: COMMERCIAL

## 2019-05-06 ENCOUNTER — APPOINTMENT (OUTPATIENT)
Dept: CT IMAGING | Age: 44
End: 2019-05-06
Payer: COMMERCIAL

## 2019-05-06 VITALS
DIASTOLIC BLOOD PRESSURE: 76 MMHG | RESPIRATION RATE: 16 BRPM | TEMPERATURE: 98.2 F | WEIGHT: 188 LBS | BODY MASS INDEX: 31.32 KG/M2 | SYSTOLIC BLOOD PRESSURE: 134 MMHG | OXYGEN SATURATION: 98 % | HEART RATE: 72 BPM | HEIGHT: 65 IN

## 2019-05-06 DIAGNOSIS — S01.01XA LACERATION OF SCALP, INITIAL ENCOUNTER: Primary | ICD-10-CM

## 2019-05-06 PROCEDURE — 99283 EMERGENCY DEPT VISIT LOW MDM: CPT

## 2019-05-06 PROCEDURE — 77030008462 HC STPLR SKN PROX J&J -A

## 2019-05-06 PROCEDURE — 75810000293 HC SIMP/SUPERF WND  RPR

## 2019-05-06 PROCEDURE — 70450 CT HEAD/BRAIN W/O DYE: CPT

## 2019-05-06 NOTE — DISCHARGE INSTRUCTIONS

## 2019-05-06 NOTE — ED PROVIDER NOTES
49-year-old female tripped and fell at home striking the back of her head on edge of a table The history is provided by the patient. Laceration The incident occurred less than 1 hour ago. The laceration is located on the scalp. The laceration is 2 cm in size. The injury mechanism is a blunt object. The pain is moderate. The pain has been constant since onset. Pertinent negatives include no numbness. The patient's last tetanus shot was less than 5 years ago. Past Medical History:  
Diagnosis Date  Bipolar disorder ([facility] [address]  Borderline personality disorder ([facility] [address]  Calculus of kidney  Chronic kidney disease   
 glomerular nephritis----  family m.d. has made a referral to a nephrologist-- but per pt hasnt made appt yet since infection on head came up  GERD (gastroesophageal reflux disease)   
 controlled with med  Headache   
 x 1 week  Hypertension   
 controlled with med  OCD (obsessive compulsive disorder)  Panic attacks  Psychiatric disorder   
 bipolar, ptsd, anxiety, ocd,mmd  
 Scalp abscess Past Surgical History:  
Procedure Laterality Date  HX APPENDECTOMY  HX GYN    
   HX GYN    
 tubal ligation Family History:  
Problem Relation Age of Onset  Breast Cancer Mother 39  Cancer Mother   
     cervical   
 Breast Cancer Sister 44  
 Heart Disease Maternal Grandmother 24  
     aneurysm  Cancer Father   
     kidney  Heart Disease Father  Kidney Disease Father  No Known Problems Maternal Grandfather  Breast Cancer Paternal Grandmother 43  
 Heart Failure Paternal Grandfather  Other Sister   
     brain tumor Social History Socioeconomic History  Marital status:  Spouse name: Not on file  Number of children: 2  
 Years of education: Not on file  Highest education level: Not on file Occupational History  Not on file Social Needs  Financial resource strain: Not hard at all  Food insecurity:  
  Worry: Never true Inability: Never true  Transportation needs:  
  Medical: No  
  Non-medical: No  
Tobacco Use  Smoking status: Current Every Day Smoker Packs/day: 0.50 Years: 20.00 Pack years: 10.00  Smokeless tobacco: Never Used  Tobacco comment: trying to tapered off Substance and Sexual Activity  Alcohol use: No  
  Alcohol/week: 0.0 oz  
  Comment: quit 11 years ago  Drug use: No  
 Sexual activity: Yes  
  Partners: Male Birth control/protection: Surgical  
Lifestyle  Physical activity:  
  Days per week: 3 days Minutes per session: 60 min  Stress: To some extent Relationships  Social connections:  
  Talks on phone: Not on file Gets together: Not on file Attends Mosque service: Not on file Active member of club or organization: Not on file Attends meetings of clubs or organizations: Not on file Relationship status:   Intimate partner violence:  
  Fear of current or ex partner: No  
  Emotionally abused: No  
  Physically abused: No  
  Forced sexual activity: No  
Other Topics Concern   Service No  
 Blood Transfusions No  
 Caffeine Concern No  
 Occupational Exposure No  
 Hobby Hazards No  
 Sleep Concern No  
 Stress Concern No  
 Weight Concern No  
 Special Diet No  
 Back Care No  
 Exercise Yes  Bike Helmet No  
 Seat Belt Yes  Self-Exams No  
Social History Narrative  Not on file ALLERGIES: Erythromycin; Novocain [procaine]; Pcn [penicillins]; Phenobarbital; Sulfa (sulfonamide antibiotics); Sulfur; and Tetracycline Review of Systems Constitutional: Negative. Negative for activity change. HENT: Negative. Eyes: Negative. Respiratory: Negative. Cardiovascular: Negative. Gastrointestinal: Negative. Genitourinary: Negative. Musculoskeletal: Negative. Skin: Negative. Neurological: Negative. Negative for numbness. Psychiatric/Behavioral: Negative. All other systems reviewed and are negative. Vitals:  
 05/06/19 3205 BP: 148/74 Pulse: 70 Resp: 16 Temp: 98.2 °F (36.8 °C) SpO2: 97% Weight: 85.3 kg (188 lb) Height: 5' 5\" (1.651 m) Physical Exam  
Constitutional: She is oriented to person, place, and time. She appears well-developed and well-nourished. No distress. HENT:  
Head: Normocephalic. Right Ear: External ear normal.  
Left Ear: External ear normal.  
Nose: Nose normal.  
Mouth/Throat: Oropharynx is clear and moist. No oropharyngeal exudate. Eyes: Pupils are equal, round, and reactive to light. Conjunctivae and EOM are normal. Right eye exhibits no discharge. Left eye exhibits no discharge. No scleral icterus. Neck: Normal range of motion. Neck supple. No JVD present. No tracheal deviation present. Cardiovascular: Normal rate, regular rhythm and intact distal pulses. Pulmonary/Chest: Effort normal and breath sounds normal. No stridor. No respiratory distress. She has no wheezes. She exhibits no tenderness. Abdominal: Soft. Bowel sounds are normal. She exhibits no distension and no mass. There is no tenderness. Musculoskeletal: Normal range of motion. She exhibits no edema or tenderness. Neurological: She is alert and oriented to person, place, and time. No cranial nerve deficit. Skin: Skin is warm and dry. No rash noted. She is not diaphoretic. No erythema. No pallor. Psychiatric: She has a normal mood and affect. Her behavior is normal. Thought content normal.  
Nursing note and vitals reviewed. MDM Number of Diagnoses or Management Options Diagnosis management comments: Assessment scalp laceration after a fall. Plan staple repair follow-up as needed Amount and/or Complexity of Data Reviewed Clinical lab tests: ordered and reviewed Wound Repair 
Date/Time: 5/6/2019 6:17 AM 
 Wound length:2.5 cm or less Foreign bodies: no foreign bodies Irrigation solution: saline Irrigation method: syringe Debridement: none Skin closure: staples Number of sutures: 5 Approximation: close My total time at bedside, performing this procedure was 1-15 minutes.

## 2019-05-06 NOTE — ED NOTES
I have reviewed discharge instructions with the patient. The patient verbalized understanding. Patient left ED via Discharge Method: ambulatory to Home with self. Opportunity for questions and clarification provided. Patient given 0 scripts. To continue your aftercare when you leave the hospital, you may receive an automated call from our care team to check in on how you are doing. This is a free service and part of our promise to provide the best care and service to meet your aftercare needs.  If you have questions, or wish to unsubscribe from this service please call [phone]. Thank you for Choosing our [facility].

## 2019-05-16 ENCOUNTER — HOSPITAL ENCOUNTER (EMERGENCY)
Age: 44
Discharge: HOME | End: 2019-05-16
Attending: EMERGENCY MEDICINE
Payer: COMMERCIAL

## 2019-05-16 VITALS
TEMPERATURE: 98.4 F | WEIGHT: 157 LBS | RESPIRATION RATE: 20 BRPM | SYSTOLIC BLOOD PRESSURE: 150 MMHG | HEART RATE: 90 BPM | DIASTOLIC BLOOD PRESSURE: 79 MMHG | HEIGHT: 67 IN | BODY MASS INDEX: 24.64 KG/M2 | OXYGEN SATURATION: 98 %

## 2019-05-16 DIAGNOSIS — Z48.02 ENCOUNTER FOR STAPLE REMOVAL: Primary | ICD-10-CM

## 2019-05-16 PROCEDURE — 77030008031

## 2019-05-16 PROCEDURE — 75810000275 HC EMERGENCY DEPT VISIT NO LEVEL OF CARE: Performed by: EMERGENCY MEDICINE

## 2019-05-16 NOTE — ED NOTES
I have reviewed discharge instructions with the patient. The patient verbalized understanding. Patient left ED via Discharge Method: ambulatory to Home with self. Opportunity for questions and clarification provided. Patient given 0 scripts. To continue your aftercare when you leave the hospital, you may receive an automated call from our care team to check in on how you are doing. This is a free service and part of our promise to provide the best care and service to meet your aftercare needs.  If you have questions, or wish to unsubscribe from this service please call [phone]. Thank you for Choosing our [facility] Emergency Department.

## 2019-05-16 NOTE — ED PROVIDER NOTES
Patient here for staple removal scalp laceration. Healed. No problems. Past Medical History:  
Diagnosis Date  Bipolar disorder ([facility] [address]  Borderline personality disorder ([facility] [address]  Calculus of kidney  Chronic kidney disease   
 glomerular nephritis----  family m.edilberto. has made a referral to a nephrologist-- but per pt hasnt made appt yet since infection on head came up  GERD (gastroesophageal reflux disease)   
 controlled with med  Headache   
 x 1 week  Hypertension   
 controlled with med  OCD (obsessive compulsive disorder)  Panic attacks  Psychiatric disorder   
 bipolar, ptsd, anxiety, ocd,mmd  
 Scalp abscess Past Surgical History:  
Procedure Laterality Date  HX APPENDECTOMY  HX GYN    
   HX GYN    
 tubal ligation Family History:  
Problem Relation Age of Onset  Breast Cancer Mother 39  Cancer Mother   
     cervical   
 Breast Cancer Sister 44  
 Heart Disease Maternal Grandmother 24  
     aneurysm  Cancer Father   
     kidney  Heart Disease Father  Kidney Disease Father  No Known Problems Maternal Grandfather  Breast Cancer Paternal Grandmother 43  
 Heart Failure Paternal Grandfather  Other Sister   
     brain tumor Social History Socioeconomic History  Marital status:  Spouse name: Not on file  Number of children: 2  
 Years of education: Not on file  Highest education level: Not on file Occupational History  Not on file Social Needs  Financial resource strain: Not hard at all  Food insecurity:  
  Worry: Never true Inability: Never true  Transportation needs:  
  Medical: No  
  Non-medical: No  
Tobacco Use  Smoking status: Current Every Day Smoker Packs/day: 0.50 Years: 20.00 Pack years: 10.00  Smokeless tobacco: Never Used  Tobacco comment: trying to tapered off Substance and Sexual Activity  Alcohol use:  No  
 Alcohol/week: 0.0 oz  
  Comment: quit 11 years ago  Drug use: No  
 Sexual activity: Yes  
  Partners: Male Birth control/protection: Surgical  
Lifestyle  Physical activity:  
  Days per week: 3 days Minutes per session: 60 min  Stress: To some extent Relationships  Social connections:  
  Talks on phone: Not on file Gets together: Not on file Attends Mormon service: Not on file Active member of club or organization: Not on file Attends meetings of clubs or organizations: Not on file Relationship status:   Intimate partner violence:  
  Fear of current or ex partner: No  
  Emotionally abused: No  
  Physically abused: No  
  Forced sexual activity: No  
Other Topics Concern   Service No  
 Blood Transfusions No  
 Caffeine Concern No  
 Occupational Exposure No  
 Hobby Hazards No  
 Sleep Concern No  
 Stress Concern No  
 Weight Concern No  
 Special Diet No  
 Back Care No  
 Exercise Yes  Bike Helmet No  
 Seat Belt Yes  Self-Exams No  
Social History Narrative  Not on file ALLERGIES: Erythromycin; Novocain [procaine]; Pcn [penicillins]; Phenobarbital; Sulfa (sulfonamide antibiotics); Sulfur; and Tetracycline Review of Systems Constitutional: Negative. Skin: Positive for wound. Vitals:  
 05/16/19 0750 BP: 150/79 Pulse: 90 Resp: 20 Temp: 98.4 °F (36.9 °C) SpO2: 98% Weight: 71.2 kg (157 lb) Height: 5' 7\" (1.702 m) Physical Exam  
Constitutional: She appears well-developed and well-nourished. HENT:  
Wound in scalp is well healed. Nurse has removed staples. Skin:  
Well healed scalp lac. MDM Number of Diagnoses or Management Options Encounter for staple removal:  
Diagnosis management comments: Wound healed Follow up as needed Patient Progress Patient progress: stable Procedures mid chest

## 2019-09-15 ENCOUNTER — HOSPITAL ENCOUNTER (EMERGENCY)
Age: 44
Discharge: HOME | End: 2019-09-15
Attending: EMERGENCY MEDICINE
Payer: COMMERCIAL

## 2019-09-15 ENCOUNTER — APPOINTMENT (OUTPATIENT)
Dept: GENERAL RADIOLOGY | Age: 44
End: 2019-09-15
Attending: EMERGENCY MEDICINE
Payer: COMMERCIAL

## 2019-09-15 VITALS
RESPIRATION RATE: 18 BRPM | HEIGHT: 68 IN | OXYGEN SATURATION: 97 % | DIASTOLIC BLOOD PRESSURE: 86 MMHG | WEIGHT: 174 LBS | SYSTOLIC BLOOD PRESSURE: 165 MMHG | HEART RATE: 92 BPM | BODY MASS INDEX: 26.37 KG/M2 | TEMPERATURE: 98 F

## 2019-09-15 DIAGNOSIS — S63.631A SPRAIN OF INTERPHALANGEAL JOINT OF LEFT INDEX FINGER, INITIAL ENCOUNTER: Primary | ICD-10-CM

## 2019-09-15 PROCEDURE — 73130 X-RAY EXAM OF HAND: CPT

## 2019-09-15 PROCEDURE — 99282 EMERGENCY DEPT VISIT SF MDM: CPT | Performed by: EMERGENCY MEDICINE

## 2019-09-15 NOTE — ED PROVIDER NOTES
[address] [facility] Jeremy Valverde is a 37 y.o. female seen on 9/15/2019 at 9:30 AM in the [facility] EMERGENCY DEPT in room ER05/05. Chief Complaint   Patient presents with    Finger Pain     HPI: 45-year-old female presenting to the emergency department for evaluation of left index finger pain that began yesterday. She states that she has a large dog that she was holding with her fingers under his collar. He took off running after another dog and her index finger got stuck in the collar and was pulled in an axial direction. She states that she has had some pain and swelling following this. Her pain is primarily over her proximal phalanx of the left hand, index finger. She states that she is able to move the finger though it does cause some discomfort. There is no laceration. No other injury noted. Historian: Patient    REVIEW OF SYSTEMS     Review of Systems   Musculoskeletal: Positive for arthralgias and joint swelling. Skin: Negative for color change and wound.        PAST MEDICAL HISTORY     Past Medical History:   Diagnosis Date    Bipolar disorder ([facility] [address]     Borderline personality disorder ([facility] [address]     Calculus of kidney     Chronic kidney disease     glomerular nephritis----  family m.d. has made a referral to a nephrologist-- but per pt hasnt made appt yet since infection on head came up    GERD (gastroesophageal reflux disease)     controlled with med    Headache     x 1 week    Hypertension     controlled with med    OCD (obsessive compulsive disorder)     Panic attacks     Psychiatric disorder     bipolar, ptsd, anxiety, ocd,mmd    Scalp abscess      Past Surgical History:   Procedure Laterality Date    HX APPENDECTOMY      HX GYN          HX GYN      tubal ligation     Social History     Socioeconomic History    Marital status:      Spouse name: Not on file    Number of children: 2    Years of education: Not on file    Highest education level: Not on file   Social Needs    Financial resource strain: Not hard at all   [place] insecurity:     Worry: Never true     Inability: Never true   Danger needs:     Medical: No     Non-medical: No   Tobacco Use    Smoking status: Current Every Day Smoker     Packs/day: 0.50     Years: 20.00     Pack years: 10.00    Smokeless tobacco: Never Used    Tobacco comment: trying to tapered off   Substance and Sexual Activity    Alcohol use: No     Alcohol/week: 0.0 standard drinks     Comment: quit 11 years ago    Drug use: No    Sexual activity: Yes     Partners: Male     Birth control/protection: Surgical   Lifestyle    Physical activity:     Days per week: 3 days     Minutes per session: 60 min    Stress: To some extent   Relationships    Social connections:     Talks on phone: Not on file     Gets together: Not on file     Attends Hindu service: Not on file     Active member of club or organization: Not on file     Attends meetings of clubs or organizations: Not on file     Relationship status:    Other Topics Concern     Service No    Blood Transfusions No    Caffeine Concern No    Occupational Exposure No    Hobby Hazards No    Sleep Concern No    Stress Concern No    Weight Concern No    Special Diet No    Back Care No    Exercise Yes    Bike Helmet No    Seat Belt Yes    Self-Exams No     Prior to Admission Medications   Prescriptions Last Dose Informant Patient Reported? Taking? Blood Pressure Monitor kit   No No   Sig: Needs home blood pressure meter for assessment twice daily. LORazepam (ATIVAN) 0.5 mg tablet   No Yes   Sig: TAKE one tablet BY MOUTH THREE TIMES DAILY AS NEEDED   Patient taking differently: Take 1 mg by mouth three (3) times daily as needed.  Per pt-- takes 3 times daily   PARoxetine (PAXIL) 20 mg tablet   No Yes   Sig: TAKE 1 TABLET BY MOUTH TWICE DAILY   lamoTRIgine (LAMICTAL) 25 mg tablet   No Yes   Sig: TAKE one tablet BY MOUTH TWICE DAILY   Patient taking differently: 100 mg. TAKE one tablet BY MOUTH TWICE DAILY   rosuvastatin (CRESTOR) 20 mg tablet   No Yes   Sig: Take 1 Tab by mouth nightly. tiZANidine (ZANAFLEX) 2 mg tablet   No Yes   Sig: TAKE 2 TABLETS BY MOUTH THREE TIMES DAILY FOR MUSCLE SPASM      Facility-Administered Medications: None     Allergies   Allergen Reactions    Erythromycin Hives and Nausea and Vomiting    Novocain [Procaine] Hives    Pcn [Penicillins] Anaphylaxis    Phenobarbital Unknown (comments)    Sulfa (Sulfonamide Antibiotics) Hives    Sulfur Hives    Tetracycline Hives        PHYSICAL EXAM       Vitals:    09/15/19 0853   BP: 165/86   Pulse: 92   Resp: 18   Temp: 98 °F (36.7 °C)   SpO2: 97%    Vital signs were reviewed. Physical Exam   Constitutional: She is oriented to person, place, and time. She appears well-developed and well-nourished. No distress. HENT:   Head: Normocephalic and atraumatic. Eyes: Pupils are equal, round, and reactive to light. EOM are normal.   Neck: Normal range of motion. Cardiovascular: Intact distal pulses. Pulmonary/Chest: Effort normal.   Musculoskeletal: Normal range of motion. Left hand: She exhibits tenderness (2nd digit, prox phalanx) and swelling (mild over prox phalanx 2nd digit). Normal sensation noted. Flexion and extension intact at MCP, PIP, and DIP, there is some slight limitation of extremes of flexion secondary to discomfort, sensation is intact distal to the area of swelling, 2+ radial and ulnar pulses are present   Neurological: She is alert and oriented to person, place, and time. Psychiatric: She has a normal mood and affect. Her behavior is normal.        MEDICAL DECISION MAKING     Differential Diagnosis:     MDM  Procedures    ED Course: There is no evidence of fracture on x-ray. Patient has range of motion intact at all levels of the finger. There are no findings suggestive of a tendon injury.   I suspect this most likely represents a sprain of the finger. I recommended conservative treatment. We have discussed reasons to return to the emergency department. The time of discharge the patient asked me to evaluate a black spot on her R anterior chest wall that she states has been present for 3 weeks. It is a well demarcated, black smooth, hard papular lesion that is tender with no surrounding erythema or edema. I am unsure what this is, but have recommended that she have this evaluated by dermatology as it may require biopsy. Disposition:  Dc to home  Diagnosis:  Finger sprain  ____________________________________________________________________  A portion of this note was generated using voice recognition dictation software. While the note has been reviewed for accuracy, please note certain words and phrases may not be transcribed as intended and some grammatical and/or typographical errors may be present.

## 2019-09-15 NOTE — DISCHARGE INSTRUCTIONS
Ice your hand for 20 minutes at a time, 4 times a day for the next 2-3 days. Take ibuprofen 600mg three times a day with food for 4-5 days. Elevate your extremity to help decrease swelling and inflammation.

## 2019-09-15 NOTE — ED NOTES
I have reviewed discharge instructions with the patient. The patient verbalized understanding. Patient left ED via Discharge Method: ambulatory to Home with self. Opportunity for questions and clarification provided. Patient given 0 scripts. To continue your aftercare when you leave the hospital, you may receive an automated call from our care team to check in on how you are doing. This is a free service and part of our promise to provide the best care and service to meet your aftercare needs.  If you have questions, or wish to unsubscribe from this service please call [phone]. Thank you for Choosing our [facility] Emergency Department.

## 2019-09-19 ENCOUNTER — APPOINTMENT (RX ONLY)
Dept: URBAN - METROPOLITAN AREA CLINIC 349 | Facility: CLINIC | Age: 44
Setting detail: DERMATOLOGY
End: 2019-09-19

## 2019-09-19 DIAGNOSIS — D18.0 HEMANGIOMA: ICD-10-CM

## 2019-09-19 DIAGNOSIS — L82.1 OTHER SEBORRHEIC KERATOSIS: ICD-10-CM

## 2019-09-19 DIAGNOSIS — Z80.8 FAMILY HISTORY OF MALIGNANT NEOPLASM OF OTHER ORGANS OR SYSTEMS: ICD-10-CM

## 2019-09-19 DIAGNOSIS — B07.8 OTHER VIRAL WARTS: ICD-10-CM

## 2019-09-19 PROBLEM — C44.629 SQUAMOUS CELL CARCINOMA OF SKIN OF LEFT UPPER LIMB, INCLUDING SHOULDER: Status: ACTIVE | Noted: 2019-09-19

## 2019-09-19 PROBLEM — C44.529 SQUAMOUS CELL CARCINOMA OF SKIN OF OTHER PART OF TRUNK: Status: ACTIVE | Noted: 2019-09-19

## 2019-09-19 PROBLEM — K21.9 GASTRO-ESOPHAGEAL REFLUX DISEASE WITHOUT ESOPHAGITIS: Status: ACTIVE | Noted: 2019-09-19

## 2019-09-19 PROBLEM — E78.5 HYPERLIPIDEMIA, UNSPECIFIED: Status: ACTIVE | Noted: 2019-09-19

## 2019-09-19 PROBLEM — F32.9 MAJOR DEPRESSIVE DISORDER, SINGLE EPISODE, UNSPECIFIED: Status: ACTIVE | Noted: 2019-09-19

## 2019-09-19 PROBLEM — L20.84 INTRINSIC (ALLERGIC) ECZEMA: Status: ACTIVE | Noted: 2019-09-19

## 2019-09-19 PROBLEM — F41.9 ANXIETY DISORDER, UNSPECIFIED: Status: ACTIVE | Noted: 2019-09-19

## 2019-09-19 PROBLEM — D18.01 HEMANGIOMA OF SKIN AND SUBCUTANEOUS TISSUE: Status: ACTIVE | Noted: 2019-09-19

## 2019-09-19 PROCEDURE — 99213 OFFICE O/P EST LOW 20 MIN: CPT | Mod: 25

## 2019-09-19 PROCEDURE — ? LIQUID NITROGEN

## 2019-09-19 PROCEDURE — 17262 DSTRJ MAL LES T/A/L 1.1-2.0: CPT

## 2019-09-19 PROCEDURE — 88305 TISSUE EXAM BY PATHOLOGIST: CPT

## 2019-09-19 PROCEDURE — A4550 SURGICAL TRAYS: HCPCS

## 2019-09-19 PROCEDURE — ? COUNSELING

## 2019-09-19 PROCEDURE — 17110 DESTRUCTION B9 LES UP TO 14: CPT | Mod: 59

## 2019-09-19 PROCEDURE — ? PATHOLOGY BILLING

## 2019-09-19 PROCEDURE — ? SHAVE REMOVAL AND DESTRUCTION

## 2019-09-19 PROCEDURE — 17271 DSTR MAL LES S/N/H/F/G 0.6-1: CPT

## 2019-09-19 ASSESSMENT — LOCATION ZONE DERM
LOCATION ZONE: FINGER
LOCATION ZONE: FINGER
LOCATION ZONE: TRUNK

## 2019-09-19 ASSESSMENT — LOCATION DETAILED DESCRIPTION DERM
LOCATION DETAILED: RIGHT DISTAL DORSAL INDEX FINGER
LOCATION DETAILED: LEFT SUPERIOR UPPER BACK
LOCATION DETAILED: RIGHT DISTAL DORSAL INDEX FINGER

## 2019-09-19 ASSESSMENT — LOCATION SIMPLE DESCRIPTION DERM
LOCATION SIMPLE: RIGHT INDEX FINGER
LOCATION SIMPLE: RIGHT INDEX FINGER
LOCATION SIMPLE: LEFT UPPER BACK

## 2019-09-19 NOTE — PROCEDURE: SHAVE REMOVAL AND DESTRUCTION
Number Of Curettages: 2
Post-Care Instructions: I reviewed with the patient in detail post-care instructions. Patient is to keep the biopsy site dry overnight, and then apply bacitracin twice daily until healed. Patient may apply hydrogen peroxide soaks to remove any crusting.  After the procedure, the patient was observed for 5-10 minutes and was oriented to,person, place and time and denied feeling dizzy, queasy and stated that they were not going to faint
Dressing: Band-Aid
Anesthesia Type: 2% lidocaine with epinephrine
Hemostasis: Electrocautery
Bill For Surgical Tray: yes
Billing Type: Third-Party Bill
Notification Instructions: Patient will be notified of biopsy results. However, patient instructed to call the office if not contacted within 2 weeks.
Accession #: dr barros read
Size Of Lesion In Cm: 0
Detail Level: Detailed
Bill As?: Note: Bill Malignant Destruction If Path Confirms Malignant Lesion. Only Bill As Shave Removal If Path Comes Back Benign. Do Not Bill Shave Removal On Malignant Lesions.: Malignant Destruction
Bill 75528 For Specimen Handling/Conveyance To Laboratory?: no
Anesthesia Volume In Cc: 0.2
Wound Care: Vaseline
Consent: Written consent was obtained and risks were reviewed including but not limited to scarring, infection, bleeding, scabbing, incomplete removal, nerve damage and allergy to anesthesia.
Size After Destruction (Required For Destruction Billing): 1
Size After Destruction (Required For Destruction Billing): 1.1
Cautery Type: electrodesiccation

## 2019-09-19 NOTE — PROCEDURE: PATHOLOGY BILLING
Immunohistochemistry ([id number] and [id number]) billing is not performed here. Please use the Immunohistochemistry Stain Billing plan to accomplish this.

## 2019-09-19 NOTE — PROCEDURE: LIQUID NITROGEN
Include Z78.9 (Other Specified Conditions Influencing Health Status) As An Associated Diagnosis?: Yes
Medical Necessity Information: It is in your best interest to select a reason for this procedure from the list below. All of these items fulfill various CMS LCD requirements except the new and changing color options.
Detail Level: Detailed
Add 52 Modifier (Optional): no
Number Of Freeze-Thaw Cycles: 3 freeze-thaw cycles
Consent: The patient's consent was obtained including but not limited to risks of crusting, scabbing, blistering, scarring, darker or lighter pigmentary change, recurrence, incomplete removal and infection.
Duration Of Freeze Thaw-Cycle (Seconds): 2
Medical Necessity Clause: This procedure was medically necessary because the lesions that were treated were:
Post-Care Instructions: I reviewed with the patient in detail post-care instructions. Patient is to wear sunprotection, and avoid picking at any of the treated lesions. Pt may apply Vaseline to crusted or scabbing areas.

## 2019-09-19 NOTE — HPI: WARTS (VERRUCA)
Is This A New Presentation, Or A Follow-Up?: Follow Up Meli
How Severe Are Your Warts?: moderate
Treatment Number (Optional): 2
Additional History: Benign destruction

## 2019-09-19 NOTE — PROCEDURE: PATHOLOGY BILLING
Immunohistochemistry ([id number] and [id number]) billing is not performed here. Please use the Immunohistochemistry Stain Billing plan to accomplish this. Immunohistochemistry ([id number] and [id number]) billing is not performed here. Please use the Immunohistochemistry Stain Billing plan to accomplish this.

## 2019-09-27 NOTE — ED TRIAGE NOTES
Pt states her left hand was stuck in her dogs collar when he pulled away from her, left second digit pain and swelling. [id number] - High Complexity

## 2019-11-22 ENCOUNTER — APPOINTMENT (OUTPATIENT)
Dept: GENERAL RADIOLOGY | Age: 44
End: 2019-11-22
Attending: EMERGENCY MEDICINE
Payer: COMMERCIAL

## 2019-11-22 ENCOUNTER — HOSPITAL ENCOUNTER (EMERGENCY)
Age: 44
Discharge: HOME | End: 2019-11-22
Attending: EMERGENCY MEDICINE
Payer: COMMERCIAL

## 2019-11-22 VITALS
SYSTOLIC BLOOD PRESSURE: 152 MMHG | TEMPERATURE: 98.4 F | OXYGEN SATURATION: 96 % | RESPIRATION RATE: 20 BRPM | HEART RATE: 83 BPM | DIASTOLIC BLOOD PRESSURE: 88 MMHG

## 2019-11-22 DIAGNOSIS — R07.9 CHEST PAIN, UNSPECIFIED TYPE: Primary | ICD-10-CM

## 2019-11-22 LAB
ALBUMIN SERPL-MCNC: 3.3 G/DL (ref 3.5–5)
ALBUMIN/GLOB SERPL: 1 {RATIO} (ref 1.2–3.5)
ALP SERPL-CCNC: 83 U/L (ref 50–130)
ALT SERPL-CCNC: 21 U/L (ref 12–65)
ANION GAP SERPL CALC-SCNC: 3 MMOL/L (ref 7–16)
AST SERPL-CCNC: 14 U/L (ref 15–37)
BASOPHILS # BLD: 0 K/UL (ref 0–0.2)
BASOPHILS NFR BLD: 0 % (ref 0–2)
BILIRUB SERPL-MCNC: 0.3 MG/DL (ref 0.2–1.1)
BUN SERPL-MCNC: 11 MG/DL (ref 6–23)
CALCIUM SERPL-MCNC: 8.4 MG/DL (ref 8.3–10.4)
CHLORIDE SERPL-SCNC: 106 MMOL/L (ref 98–107)
CO2 SERPL-SCNC: 30 MMOL/L (ref 21–32)
CREAT SERPL-MCNC: 1.33 MG/DL (ref 0.6–1)
DIFFERENTIAL METHOD BLD: NORMAL
EOSINOPHIL # BLD: 0.2 K/UL (ref 0–0.8)
EOSINOPHIL NFR BLD: 2 % (ref 0.5–7.8)
ERYTHROCYTE [DISTWIDTH] IN BLOOD BY AUTOMATED COUNT: 14.2 % (ref 11.9–14.6)
GLOBULIN SER CALC-MCNC: 3.2 G/DL (ref 2.3–3.5)
GLUCOSE SERPL-MCNC: 110 MG/DL (ref 65–100)
HCG UR QL: NEGATIVE
HCT VFR BLD AUTO: 44.3 % (ref 35.8–46.3)
HGB BLD-MCNC: 14.9 G/DL (ref 11.7–15.4)
IMM GRANULOCYTES # BLD AUTO: 0.1 K/UL (ref 0–0.5)
IMM GRANULOCYTES NFR BLD AUTO: 1 % (ref 0–5)
LYMPHOCYTES # BLD: 2.6 K/UL (ref 0.5–4.6)
LYMPHOCYTES NFR BLD: 25 % (ref 13–44)
MCH RBC QN AUTO: 31.3 PG (ref 26.1–32.9)
MCHC RBC AUTO-ENTMCNC: 33.6 G/DL (ref 31.4–35)
MCV RBC AUTO: 93.1 FL (ref 79.6–97.8)
MONOCYTES # BLD: 0.9 K/UL (ref 0.1–1.3)
MONOCYTES NFR BLD: 8 % (ref 4–12)
NEUTS SEG # BLD: 6.6 K/UL (ref 1.7–8.2)
NEUTS SEG NFR BLD: 64 % (ref 43–78)
NRBC # BLD: 0 K/UL (ref 0–0.2)
PLATELET # BLD AUTO: 206 K/UL (ref 150–450)
PMV BLD AUTO: 9.6 FL (ref 9.4–12.3)
POTASSIUM SERPL-SCNC: 3.2 MMOL/L (ref 3.5–5.1)
PROT SERPL-MCNC: 6.5 G/DL (ref 6.3–8.2)
RBC # BLD AUTO: 4.76 M/UL (ref 4.05–5.2)
SODIUM SERPL-SCNC: 139 MMOL/L (ref 136–145)
TROPONIN I BLD-MCNC: 0.01 NG/ML (ref 0.02–0.05)
TROPONIN I BLD-MCNC: 0.01 NG/ML (ref 0.02–0.05)
WBC # BLD AUTO: 10.4 K/UL (ref 4.3–11.1)

## 2019-11-22 PROCEDURE — 85025 COMPLETE CBC W/AUTO DIFF WBC: CPT

## 2019-11-22 PROCEDURE — 93005 ELECTROCARDIOGRAM TRACING: CPT | Performed by: EMERGENCY MEDICINE

## 2019-11-22 PROCEDURE — 74011250636 HC RX REV CODE- 250/636: Performed by: EMERGENCY MEDICINE

## 2019-11-22 PROCEDURE — 99285 EMERGENCY DEPT VISIT HI MDM: CPT | Performed by: EMERGENCY MEDICINE

## 2019-11-22 PROCEDURE — 71045 X-RAY EXAM CHEST 1 VIEW: CPT

## 2019-11-22 PROCEDURE — 81025 URINE PREGNANCY TEST: CPT

## 2019-11-22 PROCEDURE — 80053 COMPREHEN METABOLIC PANEL: CPT

## 2019-11-22 PROCEDURE — 74011250637 HC RX REV CODE- 250/637: Performed by: EMERGENCY MEDICINE

## 2019-11-22 PROCEDURE — 84484 ASSAY OF TROPONIN QUANT: CPT

## 2019-11-22 PROCEDURE — 81003 URINALYSIS AUTO W/O SCOPE: CPT | Performed by: EMERGENCY MEDICINE

## 2019-11-22 RX ORDER — POTASSIUM CHLORIDE 20 MEQ/1
40 TABLET, EXTENDED RELEASE ORAL
Status: COMPLETED | OUTPATIENT
Start: 2019-11-22 | End: 2019-11-22

## 2019-11-22 RX ORDER — LORAZEPAM 2 MG/ML
1 INJECTION INTRAMUSCULAR
Status: COMPLETED | OUTPATIENT
Start: 2019-11-22 | End: 2019-11-22

## 2019-11-22 RX ORDER — NAPROXEN 250 MG/1
500 TABLET ORAL
Status: COMPLETED | OUTPATIENT
Start: 2019-11-22 | End: 2019-11-22

## 2019-11-22 RX ADMIN — LORAZEPAM 1 MG: 2 INJECTION, SOLUTION INTRAMUSCULAR; INTRAVENOUS at 20:40

## 2019-11-22 RX ADMIN — POTASSIUM CHLORIDE 40 MEQ: 20 TABLET, EXTENDED RELEASE ORAL at 21:00

## 2019-11-22 RX ADMIN — NAPROXEN 500 MG: 250 TABLET ORAL at 22:20

## 2019-11-23 LAB
ATRIAL RATE: 85 BPM
CALCULATED P AXIS, ECG09: 52 DEGREES
CALCULATED R AXIS, ECG10: 52 DEGREES
CALCULATED T AXIS, ECG11: 54 DEGREES
DIAGNOSIS, 93000: NORMAL
P-R INTERVAL, ECG05: 134 MS
Q-T INTERVAL, ECG07: 358 MS
QRS DURATION, ECG06: 92 MS
QTC CALCULATION (BEZET), ECG08: 426 MS
VENTRICULAR RATE, ECG03: 85 BPM

## 2019-11-23 NOTE — ED NOTES
I have reviewed discharge instructions with the patient. The patient verbalized understanding. Patient left ED via Discharge Method: ambulatory to Home with  family). Opportunity for questions and clarification provided. Patient given 0 scripts. To continue your aftercare when you leave the hospital, you may receive an automated call from our care team to check in on how you are doing. This is a free service and part of our promise to provide the best care and service to meet your aftercare needs.  If you have questions, or wish to unsubscribe from this service please call [phone]. Thank you for Choosing our [address] [place] [address] Emergency Department.

## 2019-11-23 NOTE — ED PROVIDER NOTES
Santiago Mclaughlin is a 37 y.o. female who presents to the ED with a chief complaint of shoulder pain. She states the pain began in her left chest and went into her left arm. She also had significant history of anxiety for 10 years and she is been feeling very anxious and nervous. She denies any past cardiac history. She has had symptoms for the last hour and a half. Currently states she is trying to control the anxiety and panic, but would like something for this.            Past Medical History:   Diagnosis Date    B[facility] disorder ([facility] [address]     Borderline personality disorder ([facility] [address]     Calculus of kidney     Chronic kidney disease     glomerular nephritis----  family m.[email or web address]. has made a referral to a nephrologist-- but per pt hasnt made appt yet since infection on head came up    GERD (gastroesophageal reflux disease)     controlled with med    Headache     x 1 week    Hypertension     controlled with med    OCD (obsessive compulsive disorder)     Panic attacks     Psychiatric disorder     bipolar, ptsd, anxiety, ocd,mmd    Scalp abscess        Past Surgical History:   Procedure Laterality Date    HX APPENDECTOMY      HX GYN          HX GYN      tubal ligation         Family History:   Problem Relation Age of Onset    Breast Cancer Mother 39    Cancer Mother         cervical     Breast Cancer Sister 44    Heart Disease Maternal Grandmother 24        aneurysm     Cancer Father         kidney     Heart Disease Father     Kidney Disease Father     No Known Problems Maternal Grandfather     Breast Cancer Paternal Grandmother [address]    Heart Failure Paternal Grandfather     Other Sister         brain tumor        Social History     Socioeconomic History    Marital status:      Spouse name: Not on file    Number of children: 2    Years of education: Not on file    Highest education level: Not on file   Occupational History    Not on file   Social Needs    Financial resource strain: Not hard at all    Food insecurity:     Worry: Never true     Inability: Never true    Transportation needs:     Medical: No     Non-medical: No   Tobacco Use    Smoking status: Current Every Day Smoker     Packs/day: 0.50     Years: 20.00     Pack years: 10.00    Smokeless tobacco: Never Used    Tobacco comment: trying to tapered off   Substance and Sexual Activity    Alcohol use: No     Alcohol/week: 0.0 standard drinks     Comment: quit 11 years ago    Drug use: No    Sexual activity: Yes     Partners: Male     Birth control/protection: Surgical   Lifestyle    Physical activity:     Days per week: 3 days     Minutes per session: 60 min    Stress: To some extent   Relationships    Social connections:     Talks on phone: Not on file     Gets together: Not on file     Attends Orthodoxy service: Not on file     Active member of club or organization: Not on file     Attends meetings of clubs or organizations: Not on file     Relationship status:     Intimate partner violence:     Fear of current or ex partner: No     Emotionally abused: No     Physically abused: No     Forced sexual activity: No   Other Topics Concern     Service No    Blood Transfusions No    Caffeine Concern No    Occupational Exposure No    Hobby Hazards No    Sleep Concern No    Stress Concern No    Weight Concern No    Special Diet No    Back Care No    Exercise Yes    Bike Helmet No    Seat Belt Yes    Self-Exams No   Social History Narrative    Not on file         ALLERGIES: Erythromycin; Novocain [procaine]; Pcn [penicillins]; Phenobarbital; Sulfa (sulfonamide antibiotics); Sulfur; and Tetracycline    Review of Systems   Constitutional: Negative for chills, fatigue, fever and unexpected weight change. Respiratory: Positive for chest tightness. Negative for shortness of breath, wheezing and stridor. Cardiovascular: Positive for chest pain. Negative for palpitations.    Gastrointestinal: Negative for abdominal pain, diarrhea, nausea and vomiting. Musculoskeletal: Negative for arthralgias, neck pain and neck stiffness. Skin: Negative for color change, pallor, rash and wound. Neurological: Positive for numbness. Psychiatric/Behavioral: The patient is nervous/anxious. All other systems reviewed and are negative. There were no vitals filed for this visit. Physical Exam  Vitals signs and nursing note reviewed. Constitutional:       General: She is not in acute distress. Appearance: She is well-developed. She is not ill-appearing, toxic-appearing or diaphoretic. HENT:      Head: Normocephalic and atraumatic. Eyes:      General:         Right eye: No discharge. Left eye: No discharge. Conjunctiva/sclera: Conjunctivae normal.      Pupils: Pupils are equal, round, and reactive to light. Neck:      Musculoskeletal: Normal range of motion and neck supple. No neck rigidity or muscular tenderness. Vascular: No carotid bruit. Cardiovascular:      Rate and Rhythm: Normal rate and regular rhythm. Pulmonary:      Effort: Pulmonary effort is normal. No respiratory distress. Breath sounds: No stridor. No wheezing, rhonchi or rales. Chest:      Chest wall: No tenderness. Abdominal:      General: Abdomen is flat. There is no distension. Tenderness: There is no tenderness. There is no guarding or rebound. Musculoskeletal: Normal range of motion. General: No swelling, tenderness, deformity or signs of injury. Right lower leg: No edema. Left lower leg: No edema. Lymphadenopathy:      Cervical: No cervical adenopathy. Skin:     General: Skin is warm and dry. Capillary Refill: Capillary refill takes less than 2 seconds. Coloration: Skin is not jaundiced or pale. Findings: No bruising. Neurological:      General: No focal deficit present. Mental Status: She is alert and oriented to person, place, and time.       Comments: No focal weakness speech normal   Psychiatric:         Mood and Affect: Mood is anxious. Behavior: Behavior normal.      Comments: Tearful          MDM  Number of Diagnoses or Management Options  Chest pain, unspecified type:   Diagnosis management comments: Patient has been feeling better and on reevaluation has no chest pain. She is going home with family will follow-up with cardiology second troponin negative. Jadyn Kemp MD; 11/22/2019 @10:49 PM Voice dictation software was used during the making of this note. This software is not perfect and grammatical and other typographical errors may be present.   This note has not been proofread for errors.  ===================================================================          Amount and/or Complexity of Data Reviewed  Clinical lab tests: ordered and reviewed (Results for orders placed or performed during the hospital encounter of 11/22/19  -CBC WITH AUTOMATED DIFF       Result                      Value             Ref Range           WBC                         10.4              4.3 - 11.1 K*       RBC                         4.76              4.05 - 5.2 M*       HGB                         14.9              11.7 - 15.4 *       HCT                         44.3              35.8 - 46.3 %       MCV                         93.1              79.6 - 97.8 *       MCH                         31.3              26.1 - 32.9 *       MCHC                        33.6              31.4 - 35.0 *       RDW                         14.2              11.9 - 14.6 %       PLATELET                    206               150 - 450 K/*       MPV                         9.6               9.4 - 12.3 FL       ABSOLUTE NRBC               0.00              0.0 - 0.2 K/*       DF                          AUTOMATED                             NEUTROPHILS                 64                43 - 78 %           LYMPHOCYTES                 25                13 - 44 %           MONOCYTES 8                 4.0 - 12.0 %        EOSINOPHILS                 2                 0.5 - 7.8 %         BASOPHILS                   0                 0.0 - 2.0 %         IMMATURE GRANULOCYTES       1                 0.0 - 5.0 %         ABS. NEUTROPHILS            6.6               1.7 - 8.2 K/*       ABS. LYMPHOCYTES            2.6               0.5 - 4.6 K/*       ABS. MONOCYTES              0.9               0.1 - 1.3 K/*       ABS. EOSINOPHILS            0.2               0.0 - 0.8 K/*       ABS. BASOPHILS              0.0               0.0 - 0.2 K/*       ABS. IMM. GRANS.            0.1               0.0 - 0.5 K/*  -METABOLIC PANEL, COMPREHENSIVE       Result                      Value             Ref Range           Sodium                      139               136 - 145 mm*       Potassium                   3.2 (L)           3.5 - 5.1 mm*       Chloride                    106               98 - 107 mmo*       CO2                         30                21 - 32 mmol*       Anion gap                   3 (L)             7 - 16 mmol/L       Glucose                     110 (H)           65 - 100 mg/*       BUN                         11                6 - 23 MG/DL        Creatinine                  1.33 (H)          0.6 - 1.0 MG*       GFR est AA                  56 (L)            >60 ml/min/1*       GFR est non-AA              46 (L)            >60 ml/min/1*       Calcium                     8.4               8.3 - 10.4 M*       Bilirubin, total            0.3               0.2 - 1.1 MG*       ALT (SGPT)                  21                12 - 65 U/L         AST (SGOT)                  14 (L)            15 - 37 U/L         Alk.  phosphatase            83                50 - 130 U/L        Protein, total              6.5               6.3 - 8.2 g/*       Albumin                     3.3 (L)           3.5 - 5.0 g/*       Globulin                    3.2               2.3 - 3.5 g/*       A-G Ratio 1.0 (L)           1.2 - 3.5      -POC TROPONIN-I       Result                      Value             Ref Range           Troponin-I (POC)            0.01 (L)          0.02 - 0.05 *  -HCG URINE, QL. - POC       Result                      Value             Ref Range           Pregnancy test,urine (*     NEGATIVE          NEG            -EKG, 12 LEAD, INITIAL       Result                      Value             Ref Range           Ventricular Rate            85                BPM                 Atrial Rate                 85                BPM                 P-R Interval                134               ms                  QRS Duration                92                ms                  Q-T Interval                358               ms                  QTC Calculation (Bezet)     426               ms                  Calculated P Axis           52                degrees             Calculated R Axis           52                degrees             Calculated T Axis           54                degrees             Diagnosis                                                     Normal sinus rhythm   Normal ECG   When compared with ECG of 30-JUN-2018 14:06,   No significant change was found    )  Tests in the radiology section of CPT®: reviewed and ordered (Xr Chest Port    Result Date: 11/22/2019  AP chest radiograph History: chest pain, 43 years Female Comparison: Chest radiograph June 30, 2018 Findings:   Normal cardiomediastinal silhouette. Mild subsegmental atelectasis bilateral lung bases. No evidence of pneumothorax, pleural effusion, or air space consolidation. Visualized soft tissue and osseous structures otherwise unremarkable.       Impression:  No significant interval change.    )  Independent visualization of images, tracings, or specimens: yes (Normal sinus rhythm, narrow QRS complex, no bundle branch blocks, and no ST segment elevation )           Procedures

## 2019-11-23 NOTE — ED TRIAGE NOTES
Pt is complaining of left shoulder pain and left arm numbness. Pt states this started 30 to 45 min prior to arrival.  Pt drove herself here. Hx of anxiety.

## 2019-11-23 NOTE — DISCHARGE INSTRUCTIONS

## 2020-02-05 PROBLEM — C44.92 SQUAMOUS CELL CARCINOMA OF SKIN: Status: ACTIVE | Noted: 2020-02-05

## 2020-02-10 PROBLEM — E78.1 HYPERTRIGLYCERIDEMIA: Status: ACTIVE | Noted: 2020-02-10

## 2020-08-31 ENCOUNTER — HOSPITAL ENCOUNTER (EMERGENCY)
Age: 45
Discharge: HOME | End: 2020-08-31
Attending: EMERGENCY MEDICINE
Payer: COMMERCIAL

## 2020-08-31 ENCOUNTER — APPOINTMENT (OUTPATIENT)
Dept: GENERAL RADIOLOGY | Age: 45
End: 2020-08-31
Attending: EMERGENCY MEDICINE
Payer: COMMERCIAL

## 2020-08-31 VITALS
BODY MASS INDEX: 36.88 KG/M2 | HEART RATE: 80 BPM | DIASTOLIC BLOOD PRESSURE: 81 MMHG | WEIGHT: 235 LBS | TEMPERATURE: 99 F | SYSTOLIC BLOOD PRESSURE: 136 MMHG | RESPIRATION RATE: 18 BRPM | OXYGEN SATURATION: 98 % | HEIGHT: 67 IN

## 2020-08-31 DIAGNOSIS — S80.11XA CONTUSION OF LEG, RIGHT, INITIAL ENCOUNTER: ICD-10-CM

## 2020-08-31 DIAGNOSIS — S20.212A CONTUSION OF LEFT CHEST WALL, INITIAL ENCOUNTER: Primary | ICD-10-CM
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
## 2020-08-31 PROCEDURE — 71046 X-RAY EXAM CHEST 2 VIEWS: CPT

## 2020-08-31 PROCEDURE — 81003 URINALYSIS AUTO W/O SCOPE: CPT

## 2020-08-31 PROCEDURE — 99283 EMERGENCY DEPT VISIT LOW MDM: CPT

## 2020-08-31 PROCEDURE — 73590 X-RAY EXAM OF LOWER LEG: CPT
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
## 2020-09-01 NOTE — DISCHARGE INSTRUCTIONS
Over-the-counter medications for discomfort  Elevation and cool pack to bruise to right shin region  Recheck with increasing pain the lower leg any numbness or weakness to the ankle and foot or extreme pain to the foot motion to the shin  Compartment syndrome information is for reference but NOT confirmed presently present
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
## 2021-07-06 ENCOUNTER — HOSPITAL ENCOUNTER (EMERGENCY)
Age: 46
Discharge: HOME | End: 2021-07-06
Attending: EMERGENCY MEDICINE
Payer: COMMERCIAL

## 2021-07-06 ENCOUNTER — APPOINTMENT (OUTPATIENT)
Dept: GENERAL RADIOLOGY | Age: 46
End: 2021-07-06
Attending: EMERGENCY MEDICINE
Payer: COMMERCIAL

## 2021-07-06 VITALS
DIASTOLIC BLOOD PRESSURE: 87 MMHG | TEMPERATURE: 99.3 F | HEIGHT: 67 IN | WEIGHT: 261 LBS | OXYGEN SATURATION: 97 % | RESPIRATION RATE: 20 BRPM | HEART RATE: 91 BPM | BODY MASS INDEX: 40.97 KG/M2 | SYSTOLIC BLOOD PRESSURE: 124 MMHG

## 2021-07-06 DIAGNOSIS — S93.602A FOOT SPRAIN, LEFT, INITIAL ENCOUNTER: Primary | ICD-10-CM

## 2021-07-06 PROCEDURE — 99283 EMERGENCY DEPT VISIT LOW MDM: CPT

## 2021-07-06 PROCEDURE — 73630 X-RAY EXAM OF FOOT: CPT

## 2021-07-06 PROCEDURE — 73610 X-RAY EXAM OF ANKLE: CPT

## 2021-07-06 RX ORDER — NAPROXEN 375 MG/1
375 TABLET ORAL 2 TIMES DAILY WITH MEALS
Qty: 14 TABLET | Refills: 0 | Status: SHIPPED | OUTPATIENT
Start: 2021-07-06 | End: 2021-07-13

## 2021-07-06 NOTE — ED PROVIDER NOTES
Patient is a 44-year-old female who presents with complaint of left foot pain and swelling. Days ago she was walking and when she went to go down to stop the fall was much further than she anticipated when she rolled the left foot in the process. Since then she has had pain and swelling to the left foot and ankle. She states that she was on vacation when this happened and had x-rays taken there and was told that she may have a stress fracture. She was at an appointment with her doctor today and they advised her to get repeat x-rays of the foot. She states that she has not been able to bear weight and has been using crutches that were previously provided. No previous known injury to the left foot and ankle. The history is provided by the patient. Foot Pain   Pertinent negatives include no back pain.         Past Medical History:   Diagnosis Date    Bipolar disorder ([facility]     Borderline personality disorder ([facility]     Calculus of kidney     Chronic kidney disease     glomerular nephritis----  family m.[email or web address]. has made a referral to a nephrologist-- but per pt hasnt made appt yet since infection on head came up    GERD (gastroesophageal reflux disease)     controlled with med    Headache     x 1 week    Hypertension     controlled with med    OCD (obsessive compulsive disorder)     Panic attacks     Psychiatric disorder     bipolar, ptsd, anxiety, ocd,mmd    Scalp abscess        Past Surgical History:   Procedure Laterality Date    HX APPENDECTOMY      HX GYN          HX GYN      tubal ligation         Family History:   Problem Relation Age of Onset    Breast Cancer Mother 39    Cancer Mother         cervical     Breast Cancer Sister 44    Heart Disease Maternal Grandmother 24        aneurysm     Cancer Father         kidney     Heart Disease Father     Kidney Disease Father     No Known Problems Maternal Grandfather     Breast Cancer Paternal Grandmother 43    Heart Failure Paternal Grandfather     Other Sister         brain tumor        Social History     Socioeconomic History    Marital status:      Spouse name: Not on file    Number of children: 2    Years of education: Not on file    Highest education level: Not on file   Occupational History    Not on file   Tobacco Use    Smoking status: Current Every Day Smoker     Packs/day: 0.50     Years: 20.00     Pack years: 10.00    Smokeless tobacco: Never Used    Tobacco comment: trying to tapered off   Substance and Sexual Activity    Alcohol use: No     Alcohol/week: 0.0 standard drinks     Comment: quit 11 years ago    Drug use: No    Sexual activity: Yes     Partners: Male     Birth control/protection: Surgical   Other Topics Concern     Service No    Blood Transfusions No    Caffeine Concern No    Occupational Exposure No    Hobby Hazards No    Sleep Concern No    Stress Concern No    Weight Concern No    Special Diet No    Back Care No    Exercise Yes    Bike Helmet No    Seat Belt Yes    Self-Exams No   Social History Narrative    Not on file     Social Determinants of Health     Financial Resource Strain:     Difficulty of Paying Living Expenses:    Food Insecurity:     Worried About Running Out of Food in the Last Year:     Ran Out of Food in the Last Year:    Transportation Needs:     Lack of Transportation (Medical):      Lack of Transportation (Non-Medical):    Physical Activity:     Days of Exercise per Week:     Minutes of Exercise per Session:    Stress:     Feeling of Stress :    Social Connections:     Frequency of Communication with Friends and Family:     Frequency of Social Gatherings with Friends and Family:     Attends Christianity Services:     Active Member of Clubs or Organizations:     Attends Club or Organization Meetings:     Marital Status:    Intimate Partner Violence:     Fear of Current or Ex-Partner:     Emotionally Abused:     Physically Abused:     Sexually Abused: ALLERGIES: Erythromycin, Novocain [procaine], Pcn [penicillins], Phenobarbital, Sulfa (sulfonamide antibiotics), Sulfur, and Tetracycline    Review of Systems   Constitutional: Negative for chills, fatigue and fever. HENT: Negative for congestion and sore throat. Respiratory: Negative for cough and shortness of breath. Cardiovascular: Negative for chest pain. Gastrointestinal: Negative for abdominal pain, nausea and vomiting. Genitourinary: Negative for dysuria and flank pain. Musculoskeletal: Positive for arthralgias. Negative for back pain. Left foot and ankle pain   Skin: Negative for color change, rash and wound. Neurological: Negative for light-headedness. Psychiatric/Behavioral: Negative for behavioral problems. Vitals:    07/06/21 1443   BP: 124/87   Pulse: 91   Resp: 20   Temp: 99.3 °F (37.4 °C)   SpO2: 97%   Weight: 118.4 kg (261 lb)   Height: 5' 7\" (1.702 m)            Physical Exam  Vitals and nursing note reviewed. Constitutional:       General: She is not in acute distress. Appearance: She is not ill-appearing or toxic-appearing. HENT:      Head: Normocephalic and atraumatic. Cardiovascular:      Rate and Rhythm: Normal rate. Pulses: Normal pulses. Musculoskeletal:      Left ankle: Swelling present. No deformity or ecchymosis. No tenderness. Left foot: Swelling and tenderness (To palpation along the top of the foot) present. Comments: Pain with flexion of the first toe on the left foot   Skin:     General: Skin is warm and dry. Neurological:      Mental Status: She is alert and oriented to person, place, and time. Psychiatric:         Mood and Affect: Mood normal.         Behavior: Behavior normal.         Thought Content:  Thought content normal.         Judgment: Judgment normal.          MDM  Number of Diagnoses or Management Options  Foot sprain, left, initial encounter  Diagnosis management comments: Patient presenting with left foot pain and swelling following injury 2 days ago. X-rays of the left foot and ankle show soft tissue edema without any acute osseous abnormality. Her foot was placed in Ace wrap and she was advised to RICE. Will DC with low-dose naproxen. Patient has crutches and was instructed to continue to be non-weightbearing. She was given information for follow-up with [facility] for further evaluation of symptoms. Discussed reasons to return to the ED. Patient agreeable to plan.          Procedures

## 2021-07-06 NOTE — ED NOTES
I have reviewed discharge instructions with the patient. The patient verbalized understanding. Patient left ED via Discharge Method: ambulatory to Home with self. Opportunity for questions and clarification provided. Patient given 1 scripts. To continue your aftercare when you leave the hospital, you may receive an automated call from our care team to check in on how you are doing. This is a free service and part of our promise to provide the best care and service to meet your aftercare needs.  If you have questions, or wish to unsubscribe from this service please call [phone]. Thank you for Choosing our [address] [place] [address] Emergency Department.

## 2021-08-03 PROBLEM — N17.9 AKI (ACUTE KIDNEY INJURY) (HCC): Status: RESOLVED | Noted: 2018-06-28 | Resolved: 2021-08-03

## 2021-08-10 ENCOUNTER — APPOINTMENT (OUTPATIENT)
Dept: GENERAL RADIOLOGY | Age: 46
End: 2021-08-10
Attending: STUDENT IN AN ORGANIZED HEALTH CARE EDUCATION/TRAINING PROGRAM
Payer: COMMERCIAL

## 2021-08-10 ENCOUNTER — HOSPITAL ENCOUNTER (EMERGENCY)
Age: 46
Discharge: HOME | End: 2021-08-10
Attending: STUDENT IN AN ORGANIZED HEALTH CARE EDUCATION/TRAINING PROGRAM
Payer: COMMERCIAL

## 2021-08-10 VITALS
RESPIRATION RATE: 22 BRPM | BODY MASS INDEX: 40.97 KG/M2 | TEMPERATURE: 98.5 F | OXYGEN SATURATION: 96 % | DIASTOLIC BLOOD PRESSURE: 68 MMHG | WEIGHT: 261 LBS | SYSTOLIC BLOOD PRESSURE: 116 MMHG | HEIGHT: 67 IN | HEART RATE: 78 BPM

## 2021-08-10 DIAGNOSIS — R07.89 MUSCULOSKELETAL CHEST PAIN: Primary | ICD-10-CM

## 2021-08-10 LAB
ALBUMIN SERPL-MCNC: 3.6 G/DL (ref 3.5–5)
ALBUMIN/GLOB SERPL: 1 {RATIO} (ref 1.2–3.5)
ALP SERPL-CCNC: 78 U/L (ref 50–136)
ALT SERPL-CCNC: 66 U/L (ref 12–65)
ANION GAP SERPL CALC-SCNC: 8 MMOL/L (ref 7–16)
AST SERPL-CCNC: 56 U/L (ref 15–37)
BASOPHILS # BLD: 0.1 K/UL (ref 0–0.2)
BASOPHILS NFR BLD: 1 % (ref 0–2)
BILIRUB SERPL-MCNC: 0.3 MG/DL (ref 0.2–1.1)
BUN SERPL-MCNC: 28 MG/DL (ref 6–23)
CALCIUM SERPL-MCNC: 9.6 MG/DL (ref 8.3–10.4)
CHLORIDE SERPL-SCNC: 104 MMOL/L (ref 98–107)
CO2 SERPL-SCNC: 25 MMOL/L (ref 21–32)
CREAT SERPL-MCNC: 1.66 MG/DL (ref 0.6–1)
DIFFERENTIAL METHOD BLD: ABNORMAL
EOSINOPHIL # BLD: 0.2 K/UL (ref 0–0.8)
EOSINOPHIL NFR BLD: 3 % (ref 0.5–7.8)
ERYTHROCYTE [DISTWIDTH] IN BLOOD BY AUTOMATED COUNT: 13.2 % (ref 11.9–14.6)
GLOBULIN SER CALC-MCNC: 3.6 G/DL (ref 2.3–3.5)
GLUCOSE SERPL-MCNC: 125 MG/DL (ref 65–100)
HCT VFR BLD AUTO: 41.1 % (ref 35.8–46.3)
HGB BLD-MCNC: 13.7 G/DL (ref 11.7–15.4)
IMM GRANULOCYTES # BLD AUTO: 0.1 K/UL (ref 0–0.5)
IMM GRANULOCYTES NFR BLD AUTO: 2 % (ref 0–5)
LIPASE SERPL-CCNC: 230 U/L (ref 73–393)
LYMPHOCYTES # BLD: 1.4 K/UL (ref 0.5–4.6)
LYMPHOCYTES NFR BLD: 16 % (ref 13–44)
MAGNESIUM SERPL-MCNC: 2 MG/DL (ref 1.8–2.4)
MCH RBC QN AUTO: 32.6 PG (ref 26.1–32.9)
MCHC RBC AUTO-ENTMCNC: 33.3 G/DL (ref 31.4–35)
MCV RBC AUTO: 97.9 FL (ref 79.6–97.8)
MONOCYTES # BLD: 0.7 K/UL (ref 0.1–1.3)
MONOCYTES NFR BLD: 7 % (ref 4–12)
NEUTS SEG # BLD: 6.6 K/UL (ref 1.7–8.2)
NEUTS SEG NFR BLD: 72 % (ref 43–78)
NRBC # BLD: 0 K/UL (ref 0–0.2)
PLATELET # BLD AUTO: 264 K/UL (ref 150–450)
PMV BLD AUTO: 10.1 FL (ref 9.4–12.3)
POTASSIUM SERPL-SCNC: 4 MMOL/L (ref 3.5–5.1)
PROT SERPL-MCNC: 7.2 G/DL (ref 6.3–8.2)
RBC # BLD AUTO: 4.2 M/UL (ref 4.05–5.2)
SODIUM SERPL-SCNC: 137 MMOL/L (ref 136–145)
TROPONIN-HIGH SENSITIVITY: 6.2 PG/ML (ref 0–14)
TROPONIN-HIGH SENSITIVITY: 6.5 PG/ML (ref 0–14)
WBC # BLD AUTO: 9.1 K/UL (ref 4.3–11.1)

## 2021-08-10 PROCEDURE — 74011000250 HC RX REV CODE- 250: Performed by: STUDENT IN AN ORGANIZED HEALTH CARE EDUCATION/TRAINING PROGRAM

## 2021-08-10 PROCEDURE — 93005 ELECTROCARDIOGRAM TRACING: CPT | Performed by: STUDENT IN AN ORGANIZED HEALTH CARE EDUCATION/TRAINING PROGRAM

## 2021-08-10 PROCEDURE — 74011250637 HC RX REV CODE- 250/637: Performed by: STUDENT IN AN ORGANIZED HEALTH CARE EDUCATION/TRAINING PROGRAM

## 2021-08-10 PROCEDURE — 83735 ASSAY OF MAGNESIUM: CPT

## 2021-08-10 PROCEDURE — 85025 COMPLETE CBC W/AUTO DIFF WBC: CPT

## 2021-08-10 PROCEDURE — 71045 X-RAY EXAM CHEST 1 VIEW: CPT

## 2021-08-10 PROCEDURE — 80053 COMPREHEN METABOLIC PANEL: CPT

## 2021-08-10 PROCEDURE — 84484 ASSAY OF TROPONIN QUANT: CPT

## 2021-08-10 PROCEDURE — 99285 EMERGENCY DEPT VISIT HI MDM: CPT

## 2021-08-10 PROCEDURE — 83690 ASSAY OF LIPASE: CPT

## 2021-08-10 RX ORDER — MAG HYDROX/ALUMINUM HYD/SIMETH 200-200-20
30 SUSPENSION, ORAL (FINAL DOSE FORM) ORAL
Status: COMPLETED | OUTPATIENT
Start: 2021-08-10 | End: 2021-08-10

## 2021-08-10 RX ORDER — SODIUM CHLORIDE 0.9 % (FLUSH) 0.9 %
5-10 SYRINGE (ML) INJECTION EVERY 8 HOURS
Status: DISCONTINUED | OUTPATIENT
Start: 2021-08-10 | End: 2021-08-11 | Stop reason: HOSPADM

## 2021-08-10 RX ORDER — SODIUM CHLORIDE 0.9 % (FLUSH) 0.9 %
5-10 SYRINGE (ML) INJECTION AS NEEDED
Status: DISCONTINUED | OUTPATIENT
Start: 2021-08-10 | End: 2021-08-11 | Stop reason: HOSPADM

## 2021-08-10 RX ORDER — ACETAMINOPHEN 500 MG
1000 TABLET ORAL
Status: COMPLETED | OUTPATIENT
Start: 2021-08-10 | End: 2021-08-10

## 2021-08-10 RX ORDER — LIDOCAINE HYDROCHLORIDE 20 MG/ML
15 SOLUTION OROPHARYNGEAL
Status: COMPLETED | OUTPATIENT
Start: 2021-08-10 | End: 2021-08-10

## 2021-08-10 RX ADMIN — ALUMINUM HYDROXIDE, MAGNESIUM HYDROXIDE, DIMETHICONE 30 ML: 200; 200; 20 LIQUID ORAL at 19:53

## 2021-08-10 RX ADMIN — LIDOCAINE HYDROCHLORIDE 15 ML: 20 SOLUTION ORAL at 19:53

## 2021-08-10 RX ADMIN — ACETAMINOPHEN 1000 MG: 500 TABLET, FILM COATED ORAL at 19:51

## 2021-08-10 NOTE — ED TRIAGE NOTES
Pt brought in by [facility] from home for chest wall pain that began about 1 hour ago. Pain increases with deep breath. Pt with hx of anxiety and htn.

## 2021-08-11 LAB
ATRIAL RATE: 85 BPM
CALCULATED P AXIS, ECG09: 52 DEGREES
CALCULATED R AXIS, ECG10: 16 DEGREES
CALCULATED T AXIS, ECG11: 64 DEGREES
DIAGNOSIS, 93000: NORMAL
P-R INTERVAL, ECG05: 138 MS
Q-T INTERVAL, ECG07: 342 MS
QRS DURATION, ECG06: 80 MS
QTC CALCULATION (BEZET), ECG08: 406 MS
VENTRICULAR RATE, ECG03: 85 BPM

## 2021-08-11 NOTE — DISCHARGE INSTRUCTIONS
Continue taking Tylenol as needed for chest pain. Follow-up with family physician within 1 week. Return to the ER for worsening or worrisome symptoms.

## 2021-08-11 NOTE — ED PROVIDER NOTES
77-year-old female presents to emergency department complaining of retrosternal chest pain that began at approximately 530pm.  Patient is history of GERD as well as anxiety. She thought the pain attack but states the pain came severe and she sought medical treatment. She describes as a stabbing sensation, currently 7/10, does not radiate, located in the center of her chest chest, no improving factors. States it is worse with movement of her torso and upper extremities. Pain with palpation of the area. Denies nausea, vomiting or diaphoresis. Denies shortness of breath. Denies history of CAD.            Past Medical History:   Diagnosis Date    [facility] ([facility]     Borderline personality disorder ([facility]     Calculus of kidney     Chronic kidney disease     glomerular nephritis----  family m.d. has made a referral to a nephrologist-- but per pt hasnt made appt yet since infection on head came up    GERD (gastroesophageal reflux disease)     controlled with med    Headache     x 1 week    Hypertension     controlled with med    OCD (obsessive compulsive disorder)     Panic attacks     Psychiatric disorder     bipolar, ptsd, anxiety, ocd,mmd    Scalp abscess        Past Surgical History:   Procedure Laterality Date    HX APPENDECTOMY      HX GYN          HX GYN      tubal ligation         Family History:   Problem Relation Age of Onset    Breast Cancer Mother 39    Cancer Mother         cervical     Breast Cancer Sister 44    Heart Disease Maternal Grandmother 24        aneurysm     Cancer Father         kidney     Heart Disease Father     Kidney Disease Father     No Known Problems Maternal Grandfather     Breast Cancer Paternal Grandmother 43    Heart Failure Paternal Grandfather     Other Sister         brain tumor        Social History     Socioeconomic History    Marital status:      Spouse name: Not on file    Number of children: 2    Years of education: Not on file    Highest education level: Not on file   Occupational History    Not on file   Tobacco Use    Smoking status: Current Every Day Smoker     Packs/day: 0.50     Years: 20.00     Pack years: 10.00    Smokeless tobacco: Never Used    Tobacco comment: trying to tapered off   Substance and Sexual Activity    Alcohol use: No     Alcohol/week: 0.0 standard drinks     Comment: quit 11 years ago    Drug use: No    Sexual activity: Yes     Partners: Male     Birth control/protection: Surgical   Other Topics Concern     Service No    Blood Transfusions No    Caffeine Concern No    Occupational Exposure No    Hobby Hazards No    Sleep Concern No    Stress Concern No    Weight Concern No    Special Diet No    Back Care No    Exercise Yes    Bike Helmet No    Seat Belt Yes    Self-Exams No   Social History Narrative    Not on file     Social Determinants of Health     Financial Resource Strain:     Difficulty of Paying Living Expenses:    Food Insecurity:     Worried About Running Out of Food in the Last Year:     Ran Out of Food in the Last Year:    Transportation Needs:     Lack of Transportation (Medical):  Lack of Transportation (Non-Medical):    Physical Activity:     Days of Exercise per Week:     Minutes of Exercise per Session:    Stress:     Feeling of Stress :    Social Connections:     Frequency of Communication with Friends and Family:     Frequency of Social Gatherings with Friends and Family:     Attends Mormon Services:     Active Member of Clubs or Organizations:     Attends Club or Organization Meetings:     Marital Status:    Intimate Partner Violence:     Fear of Current or Ex-Partner:     Emotionally Abused:     Physically Abused:     Sexually Abused:           ALLERGIES: Erythromycin, Novocain [procaine], Pcn [penicillins], Phenobarbital, Sulfa (sulfonamide antibiotics), Sulfur, and Tetracycline    Review of Systems   Constitutional: Negative for chills and fever. HENT: Negative for sinus pressure and sore throat. Eyes: Negative for visual disturbance. Respiratory: Negative for cough and shortness of breath. Cardiovascular: Positive for chest pain. Gastrointestinal: Negative for abdominal pain, diarrhea, nausea and vomiting. Endocrine: Negative for polyuria. Genitourinary: Negative for difficulty urinating and dysuria. Musculoskeletal: Negative for neck pain and neck stiffness. Skin: Negative for rash. Neurological: Negative for weakness and headaches. Psychiatric/Behavioral: Negative for confusion. All other systems reviewed and are negative. Vitals:    08/10/21 1752 08/10/21 1951   BP: (!) 133/58 110/60   Pulse: 93 83   Resp: 18 22   Temp: 99 °F (37.2 °C)    SpO2: 95% 94%   Weight: 118.4 kg (261 lb)    Height: 5' 7\" (1.702 m)             Physical Exam  Vitals and nursing note reviewed. Constitutional:       Appearance: Normal appearance. She is not ill-appearing or toxic-appearing. HENT:      Head: Normocephalic and atraumatic. Nose: Nose normal.      Mouth/Throat:      Mouth: Mucous membranes are moist.   Eyes:      Extraocular Movements: Extraocular movements intact. Cardiovascular:      Rate and Rhythm: Normal rate and regular rhythm. Pulses: Normal pulses. Heart sounds: Normal heart sounds. Pulmonary:      Effort: Pulmonary effort is normal. No respiratory distress. Breath sounds: Normal breath sounds. Chest:      Chest wall: Tenderness present. Comments: Tenderness to palpation patient's sternum. No abscess or crepitus noticed. Abdominal:      General: Abdomen is flat. There is no distension. Palpations: Abdomen is soft. Tenderness: There is no abdominal tenderness. Musculoskeletal:         General: Normal range of motion. Cervical back: Normal range of motion. No rigidity. Skin:     General: Skin is warm and dry.    Neurological:      General: No focal deficit present. Mental Status: She is alert and oriented to person, place, and time. Psychiatric:         Mood and Affect: Mood normal.          MDM  Number of Diagnoses or Management Options  Musculoskeletal chest pain  Diagnosis management comments: 59-year-old female history of anxiety, bipolar and GERD presents ER with retrosternal chest pain. Patient given Tylenol, as well as GI cocktail. Labs show normal white count, stable H&H, normal electrolytes, baseline kidney dysfunction GFR 36 again consistent with her most recent Baseline labs. Patient with normal T bili, mildly elevated ALT AST, lipase 230 chest x-ray without any emergent findings. Initial troponin was 6.5, repeat pending. Repeat troponin 6.2. Patient with mild improvement in symptoms after Tylenol. No emergent findings on exam or with history, will discharge home. Patient will continue taking Tylenol as needed. Will follow up with primary care physician within 1 week. Return to the ER for worsening or worrisome symptoms. She voiced understanding and agreement with this plan. EKG shows normal sinus rhythm, rate 85, , QS 80, QTc 406, normal axis, no significant ST elevation or depressions.        Amount and/or Complexity of Data Reviewed  Clinical lab tests: ordered and reviewed  Tests in the radiology section of CPT®: ordered and reviewed  Independent visualization of images, tracings, or specimens: yes    Risk of Complications, Morbidity, and/or Mortality  Presenting problems: moderate  Diagnostic procedures: moderate  Management options: moderate           Procedures

## 2022-03-18 PROBLEM — E78.2 MIXED HYPERLIPIDEMIA: Status: ACTIVE | Noted: 2017-02-15

## 2022-03-18 PROBLEM — K21.9 GASTROESOPHAGEAL REFLUX DISEASE WITHOUT ESOPHAGITIS: Status: ACTIVE | Noted: 2017-05-16

## 2022-03-19 PROBLEM — R78.81 BACTEREMIA: Status: ACTIVE | Noted: 2018-06-30

## 2022-03-19 PROBLEM — C44.92 SQUAMOUS CELL CARCINOMA OF SKIN: Status: ACTIVE | Noted: 2020-02-05

## 2022-03-19 PROBLEM — I95.9 HYPOTENSION: Status: ACTIVE | Noted: 2018-06-28

## 2022-03-19 PROBLEM — N05.9 GLOMERULONEPHRITIS: Status: ACTIVE | Noted: 2017-09-28

## 2022-03-19 PROBLEM — F41.9 ANXIETY: Status: ACTIVE | Noted: 2017-05-16

## 2022-03-19 PROBLEM — Z72.0 TOBACCO ABUSE: Status: ACTIVE | Noted: 2018-06-29

## 2022-03-19 PROBLEM — N17.9 ACUTE KIDNEY FAILURE (HCC): Status: ACTIVE | Noted: 2018-06-29

## 2022-03-19 PROBLEM — I10 ESSENTIAL HYPERTENSION: Status: ACTIVE | Noted: 2017-02-15

## 2022-03-20 PROBLEM — E87.6 HYPOKALEMIA: Status: ACTIVE | Noted: 2018-06-28

## 2022-03-20 PROBLEM — F31.9 BIPOLAR DISORDER WITH DEPRESSION (HCC): Status: ACTIVE | Noted: 2017-02-15

## 2022-03-20 PROBLEM — E78.1 HYPERTRIGLYCERIDEMIA: Status: ACTIVE | Noted: 2020-02-10

## 2022-06-08 ENCOUNTER — HOSPITAL ENCOUNTER (EMERGENCY)
Age: 47
Discharge: HOME | End: 2022-06-08
Attending: EMERGENCY MEDICINE

## 2022-06-08 VITALS
DIASTOLIC BLOOD PRESSURE: 72 MMHG | BODY MASS INDEX: 40.81 KG/M2 | SYSTOLIC BLOOD PRESSURE: 106 MMHG | RESPIRATION RATE: 18 BRPM | TEMPERATURE: 98.9 F | HEIGHT: 67 IN | HEART RATE: 96 BPM | OXYGEN SATURATION: 96 % | WEIGHT: 260 LBS

## 2022-06-08 DIAGNOSIS — N39.0 URINARY TRACT INFECTION WITH HEMATURIA, SITE UNSPECIFIED: Primary | ICD-10-CM

## 2022-06-08 DIAGNOSIS — R31.9 URINARY TRACT INFECTION WITH HEMATURIA, SITE UNSPECIFIED: Primary | ICD-10-CM

## 2022-06-08 DIAGNOSIS — N30.91 HEMORRHAGIC CYSTITIS: ICD-10-CM

## 2022-06-08 LAB
BACTERIA URNS QL MICRO: ABNORMAL /HPF
CASTS URNS QL MICRO: ABNORMAL /LPF
EPI CELLS #/AREA URNS HPF: ABNORMAL /HPF
HCG UR QL: NEGATIVE
RBC #/AREA URNS HPF: >100 /HPF
WBC URNS QL MICRO: >100 /HPF

## 2022-06-08 PROCEDURE — 87186 SC STD MICRODIL/AGAR DIL: CPT

## 2022-06-08 PROCEDURE — 81025 URINE PREGNANCY TEST: CPT

## 2022-06-08 PROCEDURE — 87086 URINE CULTURE/COLONY COUNT: CPT

## 2022-06-08 PROCEDURE — 81003 URINALYSIS AUTO W/O SCOPE: CPT

## 2022-06-08 PROCEDURE — 99283 EMERGENCY DEPT VISIT LOW MDM: CPT

## 2022-06-08 PROCEDURE — 81015 MICROSCOPIC EXAM OF URINE: CPT

## 2022-06-08 PROCEDURE — 6370000000 HC RX 637 (ALT 250 FOR IP): Performed by: PHYSICIAN ASSISTANT

## 2022-06-08 PROCEDURE — 87088 URINE BACTERIA CULTURE: CPT

## 2022-06-08 RX ORDER — CEPHALEXIN 500 MG/1
500 CAPSULE ORAL 2 TIMES DAILY
Qty: 14 CAPSULE | Refills: 0 | Status: SHIPPED | OUTPATIENT
Start: 2022-06-08 | End: 2022-06-15

## 2022-06-08 RX ORDER — CEPHALEXIN 500 MG/1
500 CAPSULE ORAL
Status: COMPLETED | OUTPATIENT
Start: 2022-06-08 | End: 2022-06-08

## 2022-06-08 RX ADMIN — CEPHALEXIN 500 MG: 500 CAPSULE ORAL at 23:33

## 2022-06-08 ASSESSMENT — ENCOUNTER SYMPTOMS
GASTROINTESTINAL NEGATIVE: 1
RESPIRATORY NEGATIVE: 1

## 2022-06-08 ASSESSMENT — PAIN - FUNCTIONAL ASSESSMENT: PAIN_FUNCTIONAL_ASSESSMENT: 0-10

## 2022-06-08 ASSESSMENT — PAIN SCALES - GENERAL: PAINLEVEL_OUTOF10: 8

## 2022-06-09 NOTE — ED NOTES
Discharge instructions reviewed with patient and patient verbalized understanding. Patient discharged home via wheelchair with daughter. Patient discharged with 1 prescription.       Santa Oliveira RN  06/08/22 8767

## 2022-06-09 NOTE — ED TRIAGE NOTES
Pt arrives via wheelchair to triage complaining of dysuria, urinary frequency, blood in urine and dark urine that began earlier today. Hx of chronic kidney disease. Had L foot surgery 2 weeks ago.

## 2022-06-09 NOTE — ED PROVIDER NOTES
[employer] Emergency Department Provider Note                     PCP:                No primary care provider on file. Age: 55 y.o. Sex: female         No diagnosis found. [unfilled]     [facility]  Number of Diagnoses or Management Options  Hemorrhagic cystitis  Urinary tract infection with hematuria, site unspecified  Diagnosis management comments: Patient is well-appearing in no acute distress. Benign exam.  Does not appear to be in any pain. Has evidence of urinary tract infection. She says this does not feel like prior kidney stones. She is not febrile or having nausea or vomiting. No flank pain to suggest renal stone. She says this does not feel like kidney stones. We will treat with Keflex as patient says she has tolerated this previously. Very strict return precautions given for any worsening or change in symptoms, particularly fever nausea vomiting or development of abdominal/flank pain. Patient agreeable to plan. Risk of Complications, Morbidity, and/or Mortality  Presenting problems: moderate  Diagnostic procedures: moderate  Management options: moderate    Patient Progress  Patient progress: stable      Orders Placed This Encounter   Procedures    Culture, Urine    Urinalysis, Micro    POCT Urine Dipstick    POC Urine Preg (Select for females age 6-55 years)   [facility] POC Pregnancy Urine Qual        Clifton Slight is a 55 y.o. female who presents to the Emergency Department with chief complaint of    Chief Complaint   Patient presents with    Dysuria    Hematuria      Patient is a 54-year-old female who presents with 3 hours of dysuria frequency and urgency along with hematuria. Not anticoagulated. Denies fever back pain flank pain or abdominal pain. No nausea or vomiting. Says she has had history of kidney stones in the past but this does not feel like a kidney stone. Review of Systems   Constitutional: Negative. HENT: Negative. Respiratory: Negative. Cardiovascular: Negative. Gastrointestinal: Negative. Genitourinary: Positive for dysuria, frequency, hematuria and urgency. All other systems reviewed and are negative. All other systems reviewed and are negative. @[facility]@     [facility]@    [facility]@        Social Connections:     Frequency of Communication with Friends and Family: Not on file    Frequency of Social Gatherings with Friends and Family: Not on file    Attends Tenriism Services: Not on file    Active Member of Clubs or Organizations: Not on file    Attends Club or Organization Meetings: Not on file    Marital Status: Not on file        Allergies   Allergen Reactions    Penicillins Anaphylaxis    Phenobarbital Other (See Comments)    Procaine Hives    Sulfa Antibiotics Hives    Sulfur Hives    Tetracycline Hives    Erythromycin Hives and Nausea And Vomiting        Vitals signs and nursing note reviewed. Patient Vitals for the past 4 hrs:   Temp Pulse Resp BP SpO2   06/08/22 2218 98.9 °F (37.2 °C) (!) 105 18 121/84 95 %          Physical Exam  Vitals and nursing note reviewed. Constitutional:       General: She is not in acute distress. Appearance: She is well-developed. She is obese. She is not ill-appearing or toxic-appearing. HENT:      Head: Normocephalic. Eyes:      Extraocular Movements: Extraocular movements intact. Cardiovascular:      Rate and Rhythm: Normal rate and regular rhythm. Pulses: Normal pulses. Heart sounds: Normal heart sounds. Pulmonary:      Effort: Pulmonary effort is normal.      Breath sounds: Normal breath sounds. Abdominal:      Palpations: Abdomen is soft. Tenderness: There is no abdominal tenderness. There is no right CVA tenderness or left CVA tenderness. Musculoskeletal:         General: Normal range of motion. Cervical back: Normal range of motion and neck supple. Lymphadenopathy:      Cervical: No cervical adenopathy.    Skin: General: Skin is warm and dry. Findings: No rash. Neurological:      General: No focal deficit present. Mental Status: She is alert. Mental status is at baseline. Psychiatric:         Mood and Affect: Mood normal.         Behavior: Behavior normal.         Thought Content: Thought content normal.              Procedures    [unfilled]     No orders to display         Voice dictation software was used during the making of this note. This software is not perfect and grammatical and other typographical errors may be present. This note has not been completely proofread for errors.         BETITO Alvarado  06/08/22 [address] [place]  06/08/22 9926

## 2022-06-12 LAB
BACTERIA SPEC CULT: ABNORMAL
BACTERIA SPEC CULT: ABNORMAL
SERVICE CMNT-IMP: ABNORMAL

## 2023-11-24 NOTE — ED NOTES
I have reviewed discharge instructions with the patient. The patient verbalized understanding. Patient left ED via Discharge Method: ambulatory to Home with self    Opportunity for questions and clarification provided. Patient given 0 scripts. To continue your aftercare when you leave the hospital, you may receive an automated call from our care team to check in on how you are doing. This is a free service and part of our promise to provide the best care and service to meet your aftercare needs.  If you have questions, or wish to unsubscribe from this service please call [phone]. Thank you for Choosing our [facility] Emergency Department. Spontaneous, unlabored and symmetrical

## (undated) DEVICE — REM POLYHESIVE ADULT PATIENT RETURN ELECTRODE: Brand: VALLEYLAB

## (undated) DEVICE — PREMIUM WET SKIN PREP TRAY: Brand: MEDLINE INDUSTRIES, INC.

## (undated) DEVICE — SURGICAL PROCEDURE PACK BASIC ST FRANCIS

## (undated) DEVICE — INTENDED FOR TISSUE SEPARATION, AND OTHER PROCEDURES THAT REQUIRE A SHARP SURGICAL BLADE TO PUNCTURE OR CUT.: Brand: BARD-PARKER SAFETY BLADES SIZE 15, STERILE

## (undated) DEVICE — DRAPE TWL SURG 16X26IN BLU ORB04] ALLCARE INC]

## (undated) DEVICE — BLADE ASSEMB CLP HAIR FINE --

## (undated) DEVICE — SWAB SPEC MINI RAYON TIP AERB ANAERB AMIES GEL ALUMINIUM